# Patient Record
Sex: FEMALE | Race: WHITE | NOT HISPANIC OR LATINO | Employment: OTHER | URBAN - METROPOLITAN AREA
[De-identification: names, ages, dates, MRNs, and addresses within clinical notes are randomized per-mention and may not be internally consistent; named-entity substitution may affect disease eponyms.]

---

## 2017-01-03 ENCOUNTER — OFFICE VISIT (OUTPATIENT)
Dept: ORTHOPEDICS | Facility: CLINIC | Age: 62
End: 2017-01-03
Payer: COMMERCIAL

## 2017-01-03 ENCOUNTER — HOSPITAL ENCOUNTER (OUTPATIENT)
Dept: RADIOLOGY | Facility: HOSPITAL | Age: 62
Discharge: HOME | End: 2017-01-03
Attending: ORTHOPAEDIC SURGERY
Payer: COMMERCIAL

## 2017-01-03 VITALS — HEIGHT: 62 IN | BODY MASS INDEX: 27.79 KG/M2 | WEIGHT: 151 LBS

## 2017-01-03 DIAGNOSIS — M79.671 RIGHT FOOT PAIN: Primary | ICD-10-CM

## 2017-01-03 DIAGNOSIS — M79.671 RIGHT FOOT PAIN: ICD-10-CM

## 2017-01-03 PROCEDURE — 1159F MED LIST DOCD IN RCRD: CPT | Mod: S$GLB,,, | Performed by: PHYSICIAN ASSISTANT

## 2017-01-03 PROCEDURE — 99213 OFFICE O/P EST LOW 20 MIN: CPT | Mod: S$GLB,,, | Performed by: PHYSICIAN ASSISTANT

## 2017-01-03 PROCEDURE — 73630 X-RAY EXAM OF FOOT: CPT | Mod: 26,RT,, | Performed by: RADIOLOGY

## 2017-01-03 PROCEDURE — 73630 X-RAY EXAM OF FOOT: CPT | Mod: TC,RT

## 2017-01-03 PROCEDURE — 99999 PR PBB SHADOW E&M-EST. PATIENT-LVL III: CPT | Mod: PBBFAC,,, | Performed by: PHYSICIAN ASSISTANT

## 2017-01-06 NOTE — PROGRESS NOTES
Subjective:      Patient ID: Lulu Gonzalez is a 61 y.o. female.    Chief Complaint: Pain of the Right Ankle and Pain of the Right Foot    Pain   Pertinent negatives include no chest pain, chills, coughing, fever or rash.       Patient is a 61 year old female who presents to clinic for follow up of of right foot injury. Patient states that on 10/21/2016 she hit her foot on a piece of cement and fell form a standing height.   MRI order which showed :  -anterior talar edema,   -anterior calcaneal marrow edema without fracture,  -avulsion fracture of medial margin of navicula with mild proximal fragment migration  -Severe cuboid bone marrow edema, suspected comminuted non-displaced fracture  - bone bruising of 3rd and 4th metatarsal base without fracture  - malposition of lateral cuneiform relative to cuboid  - edema along LisFranc ligament, grade 2 partial sprain    She stated that she is pain free as long as in the boot. She reports that she has walkes around her home barefoot, pain free.  She denied any pain, numbness or tingling. She reports intermittent minimal swelling at the end of the day. Relieved with elevation.       Review of Systems   Constitution: Negative for chills and fever.   Cardiovascular: Negative for chest pain.   Respiratory: Negative for cough and shortness of breath.    Skin: Negative for color change, dry skin, itching, nail changes, poor wound healing and rash.   Musculoskeletal:        Right foot injury   Neurological: Negative for dizziness.   Psychiatric/Behavioral: Negative for altered mental status. The patient is not nervous/anxious.    All other systems reviewed and are negative.        Objective:        General    Constitutional: She is oriented to person, place, and time. She appears well-developed and well-nourished. No distress.   HENT:   Head: Atraumatic.   Eyes: Conjunctivae are normal.   Cardiovascular: Normal rate.    Pulmonary/Chest: Effort normal.   Neurological: She is  alert and oriented to person, place, and time.   Psychiatric: She has a normal mood and affect. Her behavior is normal.         Right Ankle/Foot Exam     Inspection   Deformity: absent  Erythema: absent  Bruising: Ankle - absent Foot - absent  Effusion: Ankle - absent Foot - absent    Range of Motion   The patient has normal right ankle ROM.    Muscle Strength   The patient has normal right ankle strength.    Other   Sensation: normal        Vascular Exam     Right Pulses  Dorsalis Pedis:      2+            Rads: no obvious fracture or dislocaiton.       Assessment:       Encounter Diagnosis   Name Primary?    Right foot pain Yes          Plan:       Discussed plan with patient.    Patient will wean from boot.   She declined formal therapy.   She is to return to clinic in 4 weeks at time x-ray of her foot  x-ray is needed.

## 2017-03-24 DIAGNOSIS — G47.00 INSOMNIA, UNSPECIFIED TYPE: ICD-10-CM

## 2017-03-24 RX ORDER — ZOLPIDEM TARTRATE 5 MG/1
TABLET ORAL
Qty: 30 TABLET | Refills: 3 | Status: SHIPPED | OUTPATIENT
Start: 2017-03-24 | End: 2018-03-15

## 2017-05-03 DIAGNOSIS — Z79.899 ENCOUNTER FOR MONITORING LONG-TERM PROTON PUMP INHIBITOR THERAPY: Primary | ICD-10-CM

## 2017-05-03 DIAGNOSIS — Z51.81 ENCOUNTER FOR MONITORING LONG-TERM PROTON PUMP INHIBITOR THERAPY: Primary | ICD-10-CM

## 2017-05-03 DIAGNOSIS — K28.9 JEJUNAL ULCER: ICD-10-CM

## 2017-05-03 RX ORDER — PANTOPRAZOLE SODIUM 40 MG/1
40 TABLET, DELAYED RELEASE ORAL
Qty: 90 TABLET | Refills: 1 | Status: SHIPPED | OUTPATIENT
Start: 2017-05-03 | End: 2017-10-30

## 2017-09-18 ENCOUNTER — PATIENT MESSAGE (OUTPATIENT)
Dept: ENDOSCOPY | Facility: HOSPITAL | Age: 62
End: 2017-09-18

## 2017-10-05 ENCOUNTER — PATIENT MESSAGE (OUTPATIENT)
Dept: ENDOSCOPY | Facility: HOSPITAL | Age: 62
End: 2017-10-05

## 2017-11-08 DIAGNOSIS — K28.9 JEJUNAL ULCER: ICD-10-CM

## 2017-11-08 RX ORDER — PANTOPRAZOLE SODIUM 40 MG/1
40 TABLET, DELAYED RELEASE ORAL
Qty: 90 TABLET | Refills: 1 | Status: SHIPPED | OUTPATIENT
Start: 2017-11-08 | End: 2018-05-14 | Stop reason: SDUPTHER

## 2018-03-12 ENCOUNTER — TELEPHONE (OUTPATIENT)
Dept: GASTROENTEROLOGY | Facility: CLINIC | Age: 63
End: 2018-03-12

## 2018-03-12 NOTE — TELEPHONE ENCOUNTER
"----- Message from Celia Espinosa MA sent at 3/12/2018  2:57 PM CDT -----  Contact: Home: [phone]   Mirtha Garrido pt. From 2016.   Pt states that she has  "holes in her stomach again" and would like to see Dr Shannon.  Home: [phone]   "

## 2018-03-13 ENCOUNTER — TELEPHONE (OUTPATIENT)
Dept: GASTROENTEROLOGY | Facility: CLINIC | Age: 63
End: 2018-03-13

## 2018-03-13 NOTE — TELEPHONE ENCOUNTER
"----- Message from Celia Espinosa MA sent at 3/12/2018  2:57 PM CDT -----  Contact: Home: [phone]   Mirtha Garrido pt. From 2016.   Pt states that she has  "holes in her stomach again" and would like to see Dr Shannon.  Home: [phone]   "

## 2018-03-15 ENCOUNTER — OFFICE VISIT (OUTPATIENT)
Dept: GASTROENTEROLOGY | Facility: CLINIC | Age: 63
End: 2018-03-15
Payer: COMMERCIAL

## 2018-03-15 VITALS
SYSTOLIC BLOOD PRESSURE: 150 MMHG | DIASTOLIC BLOOD PRESSURE: 88 MMHG | HEIGHT: 62 IN | BODY MASS INDEX: 31.08 KG/M2 | WEIGHT: 168.88 LBS | HEART RATE: 67 BPM

## 2018-03-15 DIAGNOSIS — R10.13 EPIGASTRIC PAIN: ICD-10-CM

## 2018-03-15 DIAGNOSIS — R13.14 PHARYNGOESOPHAGEAL DYSPHAGIA: ICD-10-CM

## 2018-03-15 DIAGNOSIS — K21.9 GASTROESOPHAGEAL REFLUX DISEASE, ESOPHAGITIS PRESENCE NOT SPECIFIED: Primary | ICD-10-CM

## 2018-03-15 DIAGNOSIS — Z87.19 HISTORY OF SMALL INTESTINE ULCER: ICD-10-CM

## 2018-03-15 DIAGNOSIS — Z12.11 COLON CANCER SCREENING: ICD-10-CM

## 2018-03-15 DIAGNOSIS — Z12.11 SPECIAL SCREENING FOR MALIGNANT NEOPLASMS, COLON: Primary | ICD-10-CM

## 2018-03-15 PROCEDURE — 99999 PR PBB SHADOW E&M-EST. PATIENT-LVL III: CPT | Mod: PBBFAC,,, | Performed by: NURSE PRACTITIONER

## 2018-03-15 PROCEDURE — 99214 OFFICE O/P EST MOD 30 MIN: CPT | Mod: S$GLB,,, | Performed by: NURSE PRACTITIONER

## 2018-03-15 RX ORDER — SODIUM, POTASSIUM,MAG SULFATES 17.5-3.13G
SOLUTION, RECONSTITUTED, ORAL ORAL
Qty: 1 BOTTLE | Refills: 0 | Status: ON HOLD | OUTPATIENT
Start: 2018-03-15 | End: 2018-05-11 | Stop reason: HOSPADM

## 2018-03-15 NOTE — PATIENT INSTRUCTIONS
"     Probiotics      For IBS and bloating, we typically recommend Align, VSL#3, or Villagran Colon Health, which can typically be found without a prescription at the pharmacy. Give this at least  a month to work, but can take up to 3 months to repopulate your intestines, so be patient!     VSL#3 is a potent probiotic which can be found in pill form (try [employer] for best price, or VSL3.com). $52 for a 2 month supply. The sachets are for ulcerative colitis and require a prescription.    If you go on the internet, a reputable/powerful probiotic appears to be Super Shield from [employer] at: http://www.bluerockholistics.Criptext/product/pross.asp  You can also choose PB 8 which can be found at [employer] or online.    For diarrhea from travel or antibiotics, we typically recommend Culturelle or Florastor (especially for diarrhea from C diff infection).         General information:  Pick one that has at least seven strains, and five billion CFU (colony forming units).    Products containing probiotics have flooded the market in recent years. As more people seek natural or non-drug ways to maintain their health, manufacturers have responded by offering probiotics in everything from yogurt to chocolate and granola bars to powders and capsules.    Although probiotics have been around for generations - think of the "live active cultures"in several brands of yogurt - the sheer number of products with probiotics now available may overwhelm even the most conscientious of shoppers. In some respects, the industry has grown faster than the research and scientists and doctors are calling for more studies to help determine which probiotics are beneficial and which might be a waste of money.    What Are Probiotics?  Probiotics are living microscopic organisms, or micro-organisms, that scientific research has shown to benefit your health. Most often they are bacteria, but they may also be other organisms such as yeasts. In some " cases they are similar, or the same, as the good bacteria already in your body, particularly those in your gut.    The most common probiotic bacteria come from two groups, Lactobacillus or Bifidobacterium, although it is important to remember that there are many other types of bacteria that are also classified as probiotics. Each group of bacteria has different species and each species has different strains. This is important to remember because different strains have different benefits for different parts of your body. For example, Lactobacillus casei Shirota has been shown to support the immune system and to help food move through the gut, but Lactobacillus bulgaricus may help relieve symptoms of lactose intolerance, a condition in which people cannot digest the lactose found in most milk and dairy products. In general, not all probiotics are the same, and they dont all work the same way.    Scientists are still sorting out exactly how probiotics work. They may:       Boost your immune system by producing antibodies for certain viruses.  Produce substances that prevent infection.  Prevent harmful bacteria from attaching to the gut wall and growing there.  Send signals to your cells to strengthen the mucus in your intestine and help it act as a barrier against infection.  Inhibit or destroy toxins released by certain bad bacteria that can make you sick.  Produce B vitamins necessary for metabolizing the food you eat, warding off anemia caused by deficiencies in B-6 and B-12, and maintaining healthy skin and a healthy nervous system.      Common Uses  Probiotics are most often used to promote digestive health. Because there are different kinds of probiotics, it is important to find the right one for the specific health benefit you seek. Researchers are still studying which probiotic should be used for which health or disease state. Nevertheless, probiotics have been shown to help regulate the movement of food  through the intestine. They also may help treat digestive disease, something of much interest to gastroenterologists. Some of the most common uses for probiotics include the treatment of the following:    Irritable Bowel Syndrome    Irritable bowel syndrome (IBS) is a disorder of movement in the gut. People who have IBS may have diarrhea, constipation or alternating bouts of both. IBS is not caused by injury or illness. Often the only way doctors can diagnose it is to rule out other conditions through testing.    Probiotics, particularly Bifidobacterium infantis, Sacchromyces boulardii, Lactobacillus plantarum and combination probiotics may help regulate how often people with IBS have bowel movements. Probiotics may also help relieve bloating from gas. Bifidobacterium infantis 68213, Lactobacillus plantarum 299V or Bifidobacterium bifidum [id number] have been shown to help regulate bowel movements and relieve bloating, pain and gas.    Inflammatory Bowel Disease    Though some of the symptoms are the same, inflammatory bowel disease (IBD) is different from IBS because in IBD, the intestines become inflamed. Unlike IBS, IBD is a disorder of the immune system. Symptoms include abdominal cramps, pain, diarrhea, weight loss and blood in your stools. In Crohns disease, ulcers may develop anywhere in your intestine including both the large and small bowels. In ulcerative colitis, inflammation only involves the large intestine. Bouts of inflammation may come and go, but in some cases, prescription medication is needed to keep inflammation in check.        Some studies suggest that probiotics may help decrease inflammation and delay the next bout of disease. Ulcerative colitis seems to respond better to probiotics than Crohns disease does. So far, E. coli Nissle, and a mixture of several strains of Lactobacillus, Bifidobacterium and Streptococcus may be most beneficial. Research is continuing to determine which probiotics  are best to treat IBD.    Infectious Diarrhea    Infectious diarrhea is caused by bacteria, viruses or parasites. There is evidence that probiotics such as Lactobacillus rhamnosus and Lactobacillus casei may be particularly helpful in treating diarrhea caused by rotavirus, which often affects babies and small children. Several strains of Lactobacillus and a strain of the yeast Saccharomyces boulardii may help treat and shorten the course of infectious diarrhea.    Antibiotic-Related Diarrhea  Take Lactobacillus rhamnosus GG and/or Saccharomyces boulardii (Culturelle and/or Florastor) six hours after each dose of antibiotics. Increase the dose to 10 billion CFUs per day and continue for one to two weeks after you stop taking the antibiotic.    Sometimes taking an antibiotic can cause infectious diarrhea by reducing the number of good microorganisms in your gut. Then bacteria that normally do not give you any trouble can grow out of control. One such bacterium is Clostridium difficile, which is a major cause of diarrhea in hospitalized patients and people in long-term care facilities like nursing homes. The trouble with Clostridium difficile is that it tends to come back, but there is evidence that taking probiotics such as Saccharomyces boulardii may help prevent this. There is also evidence that taking probiotics when you first start taking an antibiotic may help prevent antibiotic-related diarrhea in the first place.    Travelers Diarrhea    Its possible to get infectious diarrhea when you travel and your body is exposed to new, normally harmless bacteria (travelers diarrhea). Most studies show that probiotics are not very effective in preventing or treating travelers diarrhea in adults. Taking Saccharomyces boulardii weeks before your trip may help prevent travelers diarrhea, which usually comes from ingesting food or water thats been contaminated with bacteria.    Other Uses    Other potential uses for  probiotics include maintaining a healthy mouth, preventing and treating certain skin conditions like eczema, promoting health in the urinary tract and vagina, and preventing allergies (especially in children). There is not as much research about these uses as there is about the benefits of probiotics for your digestive system, and studies have had mixed results. If you have eczema ?Lactobacillus rhamnosus  and Lactobacillus fermentum VRI-003 PCC have been shown to help treat those itchy, scaly skin rashes -- especially in children.If you have a cold ?Some research suggests that Bifidobacterium animalis lactis Bi-07 and Lactobacillus acidophilus NCFM can help reduce the duration and severity of the common cold and flu by enhancing the bodys production of antibodies. If you have a vaginal infection ?Lactobacillus acidophilus, Lactobacillus rhamnosus GR-1 and Lactobacillus reuteri RC-14 have been shown to help prevent and clear up bacterial vaginosis and urinary tract infections in some individuals. Researchers point to Lactobacillus reuteri RC-14 and Lactobacillus rhamnosus GR-1 as the most effective stains to protect against yeast infections as theyre especially adept at colonizing the vaginal environment and fighting off unwelcome bacteria and fungi. If you have bad breath, gingivitis or periodontitis ?A probiotic lozenge or mouthwash might be your best bet. Lactobacillus reuteri LR-1 or LR-2 promote oral health by binding to teeth and gums, preventing plaque formation in the mouth. Research has demonstrated the ability of Weissella cibaria to freshen breath by inhibiting the production of sulfur compounds in the mouth.    Are Probiotics Safe?  It is generally thought that most probiotics are safe, although it is not yet known if they are safe for people with severely impaired immune systems. They may be taken by people without a diagnosed digestive problem. Their safety is evident since they have a long  history of use in dairy foods like yogurt, cheese and milk.    However, you should talk to your doctor before adding these or any other probiotics to your diet. Probiotics might not be appropriate for seniors. Some probiotics may interfere with or interact with medications. Your doctor will be able to help you determine if probiotics are right for you based on your medical history.    Research about the use of probiotics in children has grown in recent years. Although studies have shown that probiotics may help to treat infectious diarrhea in babies and small children, researchers are unsure whether probiotics are particularly helpful for children with Crohns disease or other types of inflammatory bowel disease. Ask your childs pediatrician about probiotics before giving them to your child.    The exception here is breastfeeding. Breast milk stimulates the growth of normal gut organisms that are important for a babys digestive health and developing immune system. That is one reason why doctors strongly encourage mothers to breastfeed their babies.    Overall, scientists agree that more research is necessary before they can make blanket statements about the safety of probiotics in general or about individual groups and strains. Future studies will show whether probiotics can be used to treat diseases, are safe to use for a long time, and if it is possible to take too many probiotics or mix them in inappropriate ways. These studies will also guide us as to which probiotics to use for different disorders.    Keep in mind that probiotics are considered dietary supplements and are not FDA-regulated like drugs. They are not standardized, meaning they are made in different ways by different companies and have different additives. How well a probiotic works may differ from brand to brand and even from batch to batch within the same brand. Probiotics also vary tremendously in their cost, and cost does not necessarily  reflect higher quality.    Side effects may vary, too. The most common are gas and bloating. These are usually mild and temporary. More serious side effects include allergic reactions, either to the probiotics themselves or to other ingredients in the food or supplement.    Choosing a Probiotic  Probiotics are available in yogurt and other dairy products, chocolate and granola bars, juices, powders, and capsules. You can purchase them at your local supermarket or [employer] food store as well as on the Internet. Here are some tips to help you choose.    Check the label. The more information there is on the label, the better. Ideally, the label will tell you the probiotics group, species and strain, and how many of the microorganisms will still be alive on the use-by date. Although some products guarantee how many organisms were present at the time it was manufactured, often it is less clear how many organisms are present when these products are actually consumed.    Call the . Unfortunately, many labels dont say exactly which strain is in the product; many list only the group and the species, such as Lactobacillus acidophilus or Bifidobacterium lactis. If youre planning to take a probiotic for a specific condition, call the company and find out exactly which strains its products contain and what research they have done to support their health claims. You may be able to find this information on their Web site, as well.    Beware of the Internet. If you order products from the Internet, make sure you know the company from which you are ordering. There are plenty of scammers out there who are willing to send you fake products labeled as probiotics. At best, the ingredients could be harmless, like garlic powder. At worst, they could be laced with powerful herbs, prescription medications or illegal drugs. Some companies may simply take your money and disappear.    Stick to well-established companies and  companies you know. The longer a company has been around, the more likely its products have been tested and studied repeatedly and the bigger the reputation the company has to protect. Some manufacturers that have been making products with probiotics for a while are [employer], [employer], [employer], [employer], [employer], [employer], [employer], and [employer].    Storage    One final note: Remember to store your probiotic according to package instructions and make sure the product has a sell-by or expiration date. Probiotics are living organisms. Even if they are dried and dormant, like in a powder or capsule, they must be stored properly or they will die. Some require refrigeration whereas others do not. They also have a shelf-life, so make sure you use them before the expiration date on the package.

## 2018-03-15 NOTE — PROGRESS NOTES
[facility] Note    Reason for Visit:  The primary encounter diagnosis was Gastroesophageal reflux disease, esophagitis presence not specified. Diagnoses of Epigastric pain, Pharyngoesophageal dysphagia, History of small intestine ulcer, and Colon cancer screening were also pertinent to this visit.    PCP:   Reginaldo Presley       Referring MD:  No referring provider defined for this encounter.    HPI:  This is a 62 y.o. female here for evaluation of Gerd. Ms. Gonzalez is new to the clinic. Hx of gastric pypass in 2004.     Hx of Jejunal ulcer. EGD 2/2016 One non-bleeding jejunal ulcer. Follow up EGD 4/2016- Normal stomach bx no H pylori. Anatomy of a Mae-en-Y gastric bypass. No ulcers. Hiatal hernia.     Currently, taking Protonix 40 mg daily. Having abdominal discomfort, abdominal burning, epigastric pain, and pyrosis x 2 months. Hx of dysphagia with solids and liquids. Will drink liquid or eat a solid and choke in cervical esophagus. Denies inducing vomiting, painful swallowing, SOB, and CP. NSAID usage- none.     Normal formed stool daily. Taking stool softener daily. Increased gas. Denies blood in stool and melena. Pt reports colonoscopy done at  and normal 10 years ago.     ROS:  Constitutional: No fevers, no chills, No unintentional weight loss, no fatigue   ENT: No allergies  CV: No chest pain, no palpitations, no perif. edema  Pulm: No cough, No shortness of breath, no wheezes, no sputum  Ophtho: No vision changes  GI: see HPI; also no nausea, no vomiting, no change in appetite  Derm: No rash  Heme: No lymphadenopathy, No bruising  MSK: No arthritis, no muscle pain, no muscle weakness  : No dysuria, No hematuria  Endo: No hot or cold intolerance  Neuro: No syncope, No seizure     Medical History:  has a past medical history of Hypertension.    Surgical History:  has a past surgical history that includes Cholecystectomy; Gastric bypass; Hernia repair; Hiatal hernia repair; and  "Hysterectomy.    Family History: family history is not on file..     Social History:  reports that she has quit smoking. Her smoking use included Cigarettes. She does not have any smokeless tobacco history on file. She reports that she does not drink alcohol or use drugs.    Review of patient's allergies indicates:  No Known Allergies    Current Outpatient Prescriptions   Medication Sig    levothyroxine (SYNTHROID) 100 MCG tablet Take 100 mcg by mouth once daily.    pantoprazole (PROTONIX) 40 MG tablet TAKE 1 TABLET (40 MG TOTAL) BY MOUTH BEFORE BREAKFAST.    ramipril (ALTACE) 10 MG capsule     sodium,potassium,mag sulfates (SUPREP BOWEL PREP KIT) 17.5-3.13-1.6 gram SolR As directed.  Dispense 1 kit/     No current facility-administered medications for this visit.      Objective Findings:    Vital Signs:  BP (!) 150/88   Pulse 67   Ht 5' 2" (1.575 m)   Wt 76.6 kg (168 lb 14 oz)   BMI 30.89 kg/m²   Body mass index is 30.89 kg/m².    Physical Exam:  General Appearance: Well appearing in no acute distress  Head: Normocephalic, without obvious abnormality  Eyes: No scleral icterus, EOMI  ENT: Neck supple, Lips, mucosa, and tongue normal; teeth and gums normal  Lungs: CTA bilaterally in anterior and posterior fields, no wheezes, no crackles.  Heart: Regular rate and rhythm, no murmurs heard  Abdomen: Soft, epigastric tenderness present upon palpation, non distended with positive bowel sounds in all four quadrants.  Extremities: No clubbing, cyanosis or edema  Skin: No rash to exposed areas  Neurologic: AAOx4    Labs:  Lab Results   Component Value Date    WBC 9.18 02/16/2016    HGB 12.6 02/16/2016    HCT 38.4 02/16/2016     (H) 02/16/2016    ALT 12 02/08/2016    AST 27 02/08/2016     02/16/2016    K 4.0 02/16/2016     02/16/2016    CREATININE 0.6 02/16/2016    BUN 4 (L) 02/16/2016    CO2 25 02/16/2016    INR 1.0 02/08/2016     Endoscopy:    EGD- 2/2016 One non-bleeding jejunal ulcer. Possibly a " second ulcer present.     EGD 4/2016- Normal stomach bx no H pylori. Anatomy of a Mae-en-Y gastric bypass. No ulcers. Hiatal hernia.     Colonoscopy- Pt reports done at  and normal 10 years ago.     Assessment:  1. Gastroesophageal reflux disease, esophagitis presence not specified    2. Epigastric pain    3. Pharyngoesophageal dysphagia    4. History of small intestine ulcer    5. Colon cancer screening      Recommendations:  1., 2., 3., & 4. Schedule EGD to rule out ulcer and esophagitis. Continue taking Protonix 40 mg daily. Ordered labs today- H. Pylori antibody and fasting Lipase. Ordered abd US to rule out biliary etiology. Begin taking a daily probiotic as per handout reviewed and given.   5. Ordered colonoscopy as colon cancer screening.      Follow-up in about 8 weeks (around 5/10/2018). with MICHAEL Joseph.     Order summary:  Orders Placed This Encounter    US Abdomen Complete    H. PYLORI ANTIBODY, IGG    Lipase    Case request GI: ESOPHAGOGASTRODUODENOSCOPY (EGD), COLONOSCOPY     Thank you so much for allowing me to participate in the care of Lulu Garcia, GREGORY, FNP-C

## 2018-03-16 ENCOUNTER — TELEPHONE (OUTPATIENT)
Dept: GASTROENTEROLOGY | Facility: CLINIC | Age: 63
End: 2018-03-16

## 2018-03-16 NOTE — TELEPHONE ENCOUNTER
Spoke with patient in regards to rescheduling egd & colonoscopy to and earlier date.    Patient transferred patient to endoscopy schedulers.

## 2018-03-21 ENCOUNTER — HOSPITAL ENCOUNTER (OUTPATIENT)
Dept: RADIOLOGY | Facility: HOSPITAL | Age: 63
Discharge: HOME | End: 2018-03-21
Attending: NURSE PRACTITIONER
Payer: COMMERCIAL

## 2018-03-21 ENCOUNTER — TELEPHONE (OUTPATIENT)
Dept: GASTROENTEROLOGY | Facility: CLINIC | Age: 63
End: 2018-03-21

## 2018-03-21 DIAGNOSIS — R10.13 EPIGASTRIC PAIN: ICD-10-CM

## 2018-03-21 DIAGNOSIS — R74.8 ALKALINE PHOSPHATASE ELEVATION: Primary | ICD-10-CM

## 2018-03-21 PROCEDURE — 76700 US EXAM ABDOM COMPLETE: CPT | Mod: TC

## 2018-03-21 PROCEDURE — 76700 US EXAM ABDOM COMPLETE: CPT | Mod: 26,,, | Performed by: RADIOLOGY

## 2018-03-21 NOTE — TELEPHONE ENCOUNTER
----- Message from Scout Shannon MD sent at 3/21/2018 10:31 AM CDT -----  Naz - please tell Lulu she has a mild and non-specific elevated Alk phos and recommend follow up fasting LFT's in 6 months (9/2018) orders placed.

## 2018-03-22 DIAGNOSIS — K76.89 HEPATIC CYST: Primary | ICD-10-CM

## 2018-03-22 DIAGNOSIS — R74.8 ELEVATED ALKALINE PHOSPHATASE LEVEL: ICD-10-CM

## 2018-03-23 ENCOUNTER — TELEPHONE (OUTPATIENT)
Dept: GASTROENTEROLOGY | Facility: CLINIC | Age: 63
End: 2018-03-23

## 2018-03-23 NOTE — TELEPHONE ENCOUNTER
----- Message from Emilee Garcia NP sent at 3/22/2018  2:34 PM CDT -----  Please notify MsKirk Gonzalez spoke with Dr. Shannon and reviewed case. Abd US revealed liver cysts and with an elevated liver function test would be beneficial to follow up with hepatology. Referral placed please schedule. Thanks.     Emilee

## 2018-03-23 NOTE — TELEPHONE ENCOUNTER
Spoke with patient and scheduled hepatology appointment as requested by iglesia stevens.    Patient confirmed appointment.

## 2018-03-29 ENCOUNTER — OFFICE VISIT (OUTPATIENT)
Dept: HEPATOLOGY | Facility: CLINIC | Age: 63
End: 2018-03-29
Payer: COMMERCIAL

## 2018-03-29 VITALS
BODY MASS INDEX: 30.18 KG/M2 | RESPIRATION RATE: 18 BRPM | WEIGHT: 164 LBS | DIASTOLIC BLOOD PRESSURE: 67 MMHG | OXYGEN SATURATION: 97 % | TEMPERATURE: 98 F | HEART RATE: 57 BPM | SYSTOLIC BLOOD PRESSURE: 145 MMHG | HEIGHT: 62 IN

## 2018-03-29 DIAGNOSIS — R16.0 LIVER MASS: Primary | ICD-10-CM

## 2018-03-29 DIAGNOSIS — R74.8 ELEVATED ALKALINE PHOSPHATASE LEVEL: ICD-10-CM

## 2018-03-29 PROCEDURE — 99999 PR PBB SHADOW E&M-EST. PATIENT-LVL IV: CPT | Mod: PBBFAC,,, | Performed by: PHYSICIAN ASSISTANT

## 2018-03-29 PROCEDURE — 99204 OFFICE O/P NEW MOD 45 MIN: CPT | Mod: S$GLB,,, | Performed by: PHYSICIAN ASSISTANT

## 2018-03-29 NOTE — PROGRESS NOTES
HEPATOLOGY CLINIC VISIT NOTE     REFERRING PROVIDER: Emilee Garcia    REASON FOR VISIT: elevated alk phos and hepatic cyst    HISTORY: This is a 62 y.o. White female here for evaluation of elevated alkaline phosphatase and a hepatic cyst noted on imaging. On most recent labs, alkaline phosphatase was 168, it was previously 117 in 2016, however there are only two sets of labs to trend. There is no GGT or alkaline phosphatase isoenzymes. She reports that alkaline phos has been elevated x 6 months and has been followed by her PCP. She denies previous h/o liver disease. She denies FH of liver disease. Her PMH is significant for HTN and hypothyroidism. She has had several abdominal surgeries including Mae en Y.      She denies any new medications. She denies drug and alcohol use. She does take vitamins and supplements. She reports that she takes MVI, vitamin E, vitamin C, turmeric, and flax seed.     Pt had U/S to evaluate epigastric pain. ON U/S, she was noted to have multiple hepatic cysts. Ultrasound noted that there  are multiple cystic lesions within the hepatic parenchyma.  The largest is in the left lobe and measures 4.6 x 4.4 x 7.1 cm and has multiple septations, the largest is 4 mm.  This cyst is relatively stable in appearance when compared to CT abdomen pelvis 02/08/2016. Her cysts were noted in her exlap surgical report as well.     Denies jaundice, dark urine, abdominal distention, hematemesis, melena, slowed mentation.   No abnormal skin rashes. No generalized joint pain.                   Past Medical History:   Diagnosis Date    Hypertension      Past Surgical History:   Procedure Laterality Date    CHOLECYSTECTOMY      GASTRIC BYPASS      HERNIA REPAIR      HIATAL HERNIA REPAIR      HYSTERECTOMY       FAMILY HISTORY: Negative for liver disease    SOCIAL HISTORY:   Retired,      History   Smoking Status    Former Smoker    Types: Cigarettes   Smokeless Tobacco    Not on file        History   Alcohol Use No       History   Drug Use No     ROS:   No fever, chills  No chest pain, dyspnea  (+) abdominal pain, no nausea, vomiting  No headaches  No lower extremity edema      PHYSICAL EXAM:  Friendly White female, in no acute distress; alert and oriented to person, place and time  VITALS: reviewed  HEENT: Sclerae anicteric.   NECK: Supple  CVS: Regular rate and rhythm. No murmurs  LUNGS: Normal respiratory effort. Clear bilaterally  ABDOMEN: Flat, soft, nontender. No organomegaly or masses. No ascites or hernias.   SKIN: Warm and dry. No jaundice, No obvious rashes.   EXTREMITIES: No lower extremity edema  NEURO/PSYCH: Normal gate. Memory intact. Thought and speech pattern appropriate. Behavior normal. No depression or anxiety noted.    RECENT LABS:  Lab Results   Component Value Date    WBC 5.09 03/21/2018    HGB 14.1 03/21/2018     03/21/2018     Lab Results   Component Value Date    INR 1.0 02/08/2016     Lab Results   Component Value Date    AST 28 03/21/2018    ALT 23 03/21/2018    BILITOT 0.8 03/21/2018    ALBUMIN 4.1 03/21/2018    ALKPHOS 168 (H) 03/21/2018    CREATININE 0.8 03/21/2018    BUN 16 03/21/2018     03/21/2018    K 4.6 03/21/2018     RECENT IMAGING:  U/S abdomen 03/2018  Narrative     EXAMINATION:  US ABDOMEN COMPLETE    CLINICAL HISTORY:  Epigastric pain    TECHNIQUE:  Complete abdominal ultrasound (including pancreas, liver, gallbladder, common bile duct, spleen, aorta, IVC, and kidneys) was performed.    COMPARISON:  CT abdomen pelvis 02/08/2016    FINDINGS:  Liver: Normal in size, measuring 15.8 cm. Homogeneous echotexture.  There are multiple cystic lesions within the hepatic parenchyma.  The largest is in the left lobe and measures 4.6 x 4.4 x 7.1 cm and has multiple septations, the largest is 4 mm.  This cyst is relatively stable in appearance when compared to CT abdomen pelvis 02/08/2016.    In the right hepatic lobe there are multiple cysts, some with  thin septations.  The largest measures 2.0 x 1.8 x 1.9 cm and has irregular borders.    Gallbladder: Gallbladder has been removed.    Biliary system: The common duct is not dilated, measuring 3 mm and trumpets out.  No intrahepatic ductal dilatation.    Spleen: Normal in size with a homogeneous echotexture, measuring 9.0 x 2.9 cm.    Pancreas: Unable to be visualized secondary to overlying bowel.    Right kidney: Normal in size with no hydronephrosis, measuring 10.1 cm.    Left kidney:  Normal in size with no hydronephrosis, measuring 11.0 cm.  There is a hyperechoic area in the lower pole measuring 1.1 cm that likely represents an AML.    Aorta: No aneurysm.    Inferior vena cava: Normal in appearance.    Miscellaneous: No ascites.   Impression       Multiple liver cysts.  Several with irregular borders and thin septations.  The largest is in the left lobe measuring 7.1 cm with a 4 mm septation and is relatively stable when compared to CT abdomen pelvis 02/08/2016.     ASSESSMENT  62 y.o. White female with:  1. ELEVATED ALKALINE PHOSPHATASE  -- reports duration of 6 months, previously normal  -- no fractionation or GGT to suggest origin  -- will check isoenzymes and GGT, as well as AMA, AMSA, and PHILL  -- if of liver origin, will consider MRCP versus biopsy  -- is post jacqueline en y, ?metabolic bone disease    2. HEPATIC CYSTS  -- appear stable on imaging since 2016  -- AFP, CEA, CA 19 9  -- will repeat U/S in 6 months to monitor for stability    PLAN:  1. Labs today  2. U/S in 6 months  3. F/u TBD based on imaging    Thank you for allowing me to participate in the care of Lulu Gibbs PA-C

## 2018-03-29 NOTE — PROGRESS NOTES
I have reviewed and concur with the BLANKA's history, physical, assessment, and plan.  I have personally interviewed and examined the patient at bedside.  See below addendum for my evaluation and additional findings.     62 y.o. female that presents for evaluation of elevated alkaline phosphatase and hepatic cysts.  Patient has previously had normal liver tests.  Will obtain AP isoenzymes and GGT to determine if this is a hepatic source.  If consistent with liver etiology, AMA and MRCP for further evaluation.  Based on these results, will determine if liver biopsy will be indicated in the future.    Reassured patient that hepatic cysts benign.  No significant compressive symptoms at this time.  Recommend continued surveillance in 6-12 months given complexity of largest cyst at 7cm.  If no concerning change over time, may not require long term surveillance given benign nature of cystic lesions.        Patient will return to clinic with MICHAEL Lynch

## 2018-03-29 NOTE — LETTER
March 29, 2018      Emilee Garcia, NP  [address] [facility] [address]           [facility] - Hepatology  [address] [facility] [address]  [facility] [address]  Phone: [phone]  Fax: [phone]          Patient: Lulu Gonzalez   MR Number: [id number]   YOB: 1955   Date of Visit: 3/29/2018       Dear Emilee Garcia:    Thank you for referring Lulu Gonzalez to me for evaluation. Attached you will find relevant portions of my assessment and plan of care.    If you have questions, please do not hesitate to call me. I look forward to following Lulu Gonzalez along with you.    Sincerely,    SHANIA Mireles  CC:  No Recipients    If you would like to receive this communication electronically, please contact [email or web address] or [phone] to request more information on [employer] Link access.    For providers and/or their staff who would like to refer a patient to [facility], please contact us through our one-stop-shop provider referral line, [facility], at [phone].    If you feel you have received this communication in error or would no longer like to receive these types of communications, please e-mail [email or web address]

## 2018-04-20 ENCOUNTER — LAB VISIT (OUTPATIENT)
Dept: LAB | Facility: HOSPITAL | Age: 63
End: 2018-04-20
Attending: PHYSICIAN ASSISTANT
Payer: COMMERCIAL

## 2018-04-20 DIAGNOSIS — R16.0 LIVER MASS: ICD-10-CM

## 2018-04-20 LAB
AFP SERPL-MCNC: 2.3 NG/ML
CANCER AG19-9 SERPL-ACNC: 23 U/ML
CEA SERPL-MCNC: 3.7 NG/ML
GGT SERPL-CCNC: 15 U/L

## 2018-04-20 PROCEDURE — 84075 ASSAY ALKALINE PHOSPHATASE: CPT

## 2018-04-20 PROCEDURE — 82977 ASSAY OF GGT: CPT

## 2018-04-20 PROCEDURE — 82105 ALPHA-FETOPROTEIN SERUM: CPT

## 2018-04-20 PROCEDURE — 86235 NUCLEAR ANTIGEN ANTIBODY: CPT

## 2018-04-20 PROCEDURE — 86256 FLUORESCENT ANTIBODY TITER: CPT | Mod: 91

## 2018-04-20 PROCEDURE — 82378 CARCINOEMBRYONIC ANTIGEN: CPT

## 2018-04-20 PROCEDURE — 86301 IMMUNOASSAY TUMOR CA 19-9: CPT

## 2018-04-20 PROCEDURE — 84080 ASSAY ALKALINE PHOSPHATASES: CPT

## 2018-04-20 PROCEDURE — 86038 ANTINUCLEAR ANTIBODIES: CPT

## 2018-04-20 PROCEDURE — 36415 COLL VENOUS BLD VENIPUNCTURE: CPT

## 2018-04-23 LAB
ANA SER QL IF: NORMAL
MITOCHONDRIA AB TITR SER IF: NORMAL {TITER}
SMOOTH MUSCLE AB TITR SER IF: NORMAL {TITER}

## 2018-04-26 LAB
ALP BONE CFR SERPL: 73 % (ref 28–66)
ALP INTEST CFR SERPL: 0 % (ref 1–24)
ALP LIVER CFR SERPL: 27 % (ref 25–69)
ALP PLAC CFR SERPL: 0 %
ALP SERPL-CCNC: 134 U/L (ref 33–130)

## 2018-04-30 ENCOUNTER — TELEPHONE (OUTPATIENT)
Dept: HEPATOLOGY | Facility: CLINIC | Age: 63
End: 2018-04-30

## 2018-04-30 NOTE — TELEPHONE ENCOUNTER
----- Message from Ayo Gibbs PA-C sent at 4/30/2018  8:35 AM CDT -----  Please notify her that these labs are stable. The alkaline phosphatase breakdown shows that the elevation is not coming from her liver but could be of bone origin. I suggest she see her PCP for further evaluation of this. We will repeat her U/S in 6 months, but no f/u is needed in liver clinic. Thanks

## 2018-05-10 ENCOUNTER — ANESTHESIA EVENT (OUTPATIENT)
Dept: ENDOSCOPY | Facility: HOSPITAL | Age: 63
End: 2018-05-10
Payer: COMMERCIAL

## 2018-05-11 ENCOUNTER — ANESTHESIA (OUTPATIENT)
Dept: ENDOSCOPY | Facility: HOSPITAL | Age: 63
End: 2018-05-11
Payer: COMMERCIAL

## 2018-05-11 ENCOUNTER — SURGERY (OUTPATIENT)
Age: 63
End: 2018-05-11

## 2018-05-11 ENCOUNTER — HOSPITAL ENCOUNTER (OUTPATIENT)
Facility: HOSPITAL | Age: 63
Discharge: HOME | End: 2018-05-11
Attending: INTERNAL MEDICINE | Admitting: INTERNAL MEDICINE
Payer: COMMERCIAL

## 2018-05-11 VITALS
BODY MASS INDEX: 29.44 KG/M2 | HEIGHT: 62 IN | SYSTOLIC BLOOD PRESSURE: 138 MMHG | WEIGHT: 160 LBS | TEMPERATURE: 98 F | DIASTOLIC BLOOD PRESSURE: 72 MMHG | HEART RATE: 54 BPM | RESPIRATION RATE: 18 BRPM | OXYGEN SATURATION: 99 %

## 2018-05-11 DIAGNOSIS — K21.9 GASTROESOPHAGEAL REFLUX DISEASE, ESOPHAGITIS PRESENCE NOT SPECIFIED: ICD-10-CM

## 2018-05-11 PROCEDURE — 43239 EGD BIOPSY SINGLE/MULTIPLE: CPT | Performed by: INTERNAL MEDICINE

## 2018-05-11 PROCEDURE — E9220 PRA ENDO ANESTHESIA: HCPCS | Mod: 33,,, | Performed by: NURSE ANESTHETIST, CERTIFIED REGISTERED

## 2018-05-11 PROCEDURE — 45380 COLONOSCOPY AND BIOPSY: CPT | Performed by: INTERNAL MEDICINE

## 2018-05-11 PROCEDURE — 88305 TISSUE EXAM BY PATHOLOGIST: CPT | Performed by: PATHOLOGY

## 2018-05-11 PROCEDURE — 88305 TISSUE EXAM BY PATHOLOGIST: CPT | Mod: 26,,, | Performed by: PATHOLOGY

## 2018-05-11 PROCEDURE — 25000003 PHARM REV CODE 250: Performed by: NURSE ANESTHETIST, CERTIFIED REGISTERED

## 2018-05-11 PROCEDURE — 43239 EGD BIOPSY SINGLE/MULTIPLE: CPT | Mod: 51,,, | Performed by: INTERNAL MEDICINE

## 2018-05-11 PROCEDURE — 45380 COLONOSCOPY AND BIOPSY: CPT | Mod: 33,,, | Performed by: INTERNAL MEDICINE

## 2018-05-11 PROCEDURE — 27201012 HC FORCEPS, HOT/COLD, DISP: Performed by: INTERNAL MEDICINE

## 2018-05-11 PROCEDURE — 37000009 HC ANESTHESIA EA ADD 15 MINS: Performed by: INTERNAL MEDICINE

## 2018-05-11 PROCEDURE — 37000008 HC ANESTHESIA 1ST 15 MINUTES: Performed by: INTERNAL MEDICINE

## 2018-05-11 PROCEDURE — 63600175 PHARM REV CODE 636 W HCPCS: Performed by: NURSE ANESTHETIST, CERTIFIED REGISTERED

## 2018-05-11 PROCEDURE — 25000003 PHARM REV CODE 250: Performed by: INTERNAL MEDICINE

## 2018-05-11 RX ORDER — PROPOFOL 10 MG/ML
VIAL (ML) INTRAVENOUS CONTINUOUS PRN
Status: DISCONTINUED | OUTPATIENT
Start: 2018-05-11 | End: 2018-05-11

## 2018-05-11 RX ORDER — PROPOFOL 10 MG/ML
VIAL (ML) INTRAVENOUS
Status: DISCONTINUED | OUTPATIENT
Start: 2018-05-11 | End: 2018-05-11

## 2018-05-11 RX ORDER — SODIUM CHLORIDE 9 MG/ML
INJECTION, SOLUTION INTRAVENOUS CONTINUOUS
Status: DISCONTINUED | OUTPATIENT
Start: 2018-05-11 | End: 2018-05-11 | Stop reason: HOSPADM

## 2018-05-11 RX ORDER — LIDOCAINE HCL/PF 100 MG/5ML
SYRINGE (ML) INTRAVENOUS
Status: DISCONTINUED | OUTPATIENT
Start: 2018-05-11 | End: 2018-05-11

## 2018-05-11 RX ORDER — GLYCOPYRROLATE 0.2 MG/ML
INJECTION INTRAMUSCULAR; INTRAVENOUS
Status: DISCONTINUED | OUTPATIENT
Start: 2018-05-11 | End: 2018-05-11

## 2018-05-11 RX ADMIN — GLYCOPYRROLATE 0.1 MG: 0.2 INJECTION, SOLUTION INTRAMUSCULAR; INTRAVENOUS at 08:05

## 2018-05-11 RX ADMIN — PROPOFOL 80 MG: 10 INJECTION, EMULSION INTRAVENOUS at 08:05

## 2018-05-11 RX ADMIN — SODIUM CHLORIDE: 0.9 INJECTION, SOLUTION INTRAVENOUS at 07:05

## 2018-05-11 RX ADMIN — PROPOFOL 30 MG: 10 INJECTION, EMULSION INTRAVENOUS at 08:05

## 2018-05-11 RX ADMIN — LIDOCAINE HYDROCHLORIDE 75 MG: 20 INJECTION, SOLUTION INTRAVENOUS at 08:05

## 2018-05-11 RX ADMIN — PROPOFOL 20 MG: 10 INJECTION, EMULSION INTRAVENOUS at 08:05

## 2018-05-11 RX ADMIN — PROPOFOL 150 MCG/KG/MIN: 10 INJECTION, EMULSION INTRAVENOUS at 08:05

## 2018-05-11 RX ADMIN — SODIUM CHLORIDE: 0.9 INJECTION, SOLUTION INTRAVENOUS at 09:05

## 2018-05-11 NOTE — DISCHARGE INSTRUCTIONS
Eating a High-Fiber Diet  Fiber is what gives strength and structure to plants. Most grains, beans, vegetables, and fruits contain fiber. Foods rich in fiber are often low in calories and fat, and they fill you up more. They may also reduce your risks for certain health problems. To find out the amount of fiber in canned, packaged, or frozen foods, read the Nutrition Facts label. It tells you how much fiber is in a serving.    Types of fiber and their benefits  There are two types of fiber: insoluble and soluble. They both aid digestion and help you maintain a healthy weight.  · Insoluble fiber. This is found in whole grains, cereals, certain fruits and vegetables such as apple skin, corn, and carrots. Insoluble fiber may prevent constipation and reduce the risk for certain types of cancer.  · Soluble fiber. This type of fiber is in oats, beans, and certain fruits and vegetables such as strawberries and peas. Soluble fiber can reduce cholesterol, which may help lower the risk for heart disease. It also helps control blood sugar levels.  Look for high-fiber foods  Try these foods to add fiber to your diet:  · Whole-grain breads and cereals. Try to eat 6 to 8 ounces a day. Include wheat and oat bran cereals, whole-wheat muffins or toast, and corn tortillas in your meals.  · Fruits. Try to eat 2 cups a day. Apples, oranges, strawberries, pears, and bananas are good sources. (Note: Fruit juice is low in fiber.)  · Vegetables. Try to eat at least 2.5 cups a day. Add asparagus, carrots, broccoli, peas, and corn to your meals.  · Beans. One cup of cooked lentils gives you over 15 grams of fiber. Try navy beans, lentils, and chickpeas.  · Seeds. A small handful of seeds gives you about 3 grams of fiber. Try sunflower seeds.  Keep track of your fiber  Keep track of how much fiber you eat. Start by reading food labels. Then eat a variety of foods high in fiber. As you begin to eat more fiber, ask your healthcare provider  how much water you should be drinking to keep your digestive system working smoothly.  You should aim for a certain amount of fiber in your diet each day. If you are a woman, that amount is between 25 and 28 grams per day. Men should aim for 30 to 33 grams per day. After age 50, your daily fiber needs drop to 22 grams for women and 28 grams for men.  Before you reach for the fiber supplements, think about this. Fiber is found naturally in healthy whole foods. It gives you that feeling of fullness after you eat. Taking fiber supplements or eating fiber-enriched foods will not give you this full feeling.  Your fiber intake is a good measure for the quality of your overall diet. If you are missing out on your daily amount of fiber, you may be lacking other important nutrients as well.  Date Last Reviewed: 5/11/2015 © 2000-2017 [employer]. [address] [place] [address]. All rights reserved. This information is not intended as a substitute for professional medical care. Always follow your healthcare professional's instructions.        Diverticulosis    Diverticulosis means that small pouches have formed in the wall of your large intestine (colon). Most often, this problem causes no symptoms and is common as people age. But the pouches in the colon are at risk of becoming infected. When this happens, the condition is called diverticulitis. Although most people with diverticulosis never develop diverticulitis, it is still not uncommon. Rectal bleeding can also occur and in less common situations, a type of colon inflammation called colitis.  While most people do not have symptoms, some people with diverticulosis may have:  · Abdominal cramps and pain  · Bloating  · Constipation  · Change in bowel habits  Causes  The exact cause of diverticulosis (and diverticulitis) has not been proved, but a few things are associated with the condition:  · Low-fiber diet  · Constipation  · Lack of  exercise  Your healthcare provider will talk with you about how to manage your condition. Diet changes may be all that are needed to help control diverticulosis and prevent progression to diverticulitis. If you develop diverticulitis, you will likely need other treatments.  Home care  You may be told to take fiber supplements daily. Fiber adds bulk to the stool so that it passes through the colon more easily. Stool softeners may be recommended. You may also be given medications for pain relief. Be sure to take all medications as directed.  In the past, people were told to avoid corn, nuts, and seeds. This is no longer necessary.  Follow these guidelines when caring for yourself at home:  · Eat unprocessed foods that are high in fiber. Whole grains, fruits, and vegetables are good choices.  · Drink 6 to 8 glasses of water every day unless your healthcare provider has you limit how much fluid you should have.  · Watch for changes in your bowel movements. Tell your provider if you notice any changes.  · Begin an exercise program. Ask your provider how to get started. Generally, walking is the best.  · Get plenty of rest and sleep.  Follow-up care  Follow up with your healthcare provider, or as advised. Regular visits may be needed to check on your health. Sometimes special procedures such as colonoscopy, are needed after an episode of diverticulitis or blooding. Be sure to keep all your appointments.  If a stool sample was taken, or cultures were done, you should be told if they are positive, or if your treatment needs to be changed. You can call as directed for the results.  If X-rays were done, a radiologist will look at them. You will be told if there is a change in your treatment.  If antibiotics were prescribed, be sure to finish them all.  When to seek medical advice  Call your healthcare provider right away if any of these occur:  · Fever of 100.4°F (38°C) or higher, or as directed by your healthcare  provider  · Severe cramps in the lower left side of the abdomen or pain that is getting worse  · Tenderness in the lower left side of the abdomen or worsening pain throughout the abdomen  · Diarrhea or constipation that doesn't get better within 24 hours  · Nausea and vomiting  · Bleeding from the rectum  Call 911  Call emergency services if any of the following occur:  · Trouble breathing  · Confusion  · Very drowsy or trouble awakening  · Fainting or loss of consciousness  · Rapid heart rate  · Chest pain  Date Last Reviewed: 12/30/2015 © 2000-2017 [employer]. [address] [place] [address]. All rights reserved. This information is not intended as a substitute for professional medical care. Always follow your healthcare professional's instructions.        Upper GI Endoscopy     During endoscopy, a long, flexible tube is used to view the inside of your upper GI tract.      Upper GI endoscopy allows your healthcare provider to look directly into the beginning of your gastrointestinal (GI) tract. The esophagus, stomach, and duodenum (the first part of the small intestine) make up the upper GI tract.   Before the exam  Follow these and any other instructions you are given before your endoscopy. If you dont follow the healthcare providers instructions carefully, the test may need to be canceled or done over:  · Don't eat or drink anything after midnight the night before your exam. If your exam is in the afternoon, drink only clear liquids in the morning. Don't eat or drink anything for 8 hours before the exam. In some cases, you may be able to take medicines with sips of water until 2 hours before the procedure. Speak with your healthcare provider about this.   · Bring your X-rays and any other test results you have.  · Because you will be sedated, arrange for an adult to drive you home after the exam.  · Tell your healthcare provider before the exam if you are taking any medicines or have  any medical problems.  The procedure  Here is what to expect:  · You will lie on the endoscopy table. Usually patients lie on the left side.  · You will be monitored and given oxygen.  · Your throat may be numbed with a spray or gargle. You are given medicine through an intravenous (IV) line that will help you relax and remain comfortable. You may be awake or asleep during the procedure.  · The healthcare provider will put the endoscope in your mouth and down your esophagus. It is thinner than most pieces of food that you swallow. It will not affect your breathing. The medicine helps keep you from gagging.  · Air is put into your GI tract to expand it. It can make you burp.  · During the procedure, the healthcare provider can take biopsies (tissue samples), remove abnormalities, such as polyps, or treat abnormalities through a variety of devices placed through the endoscope. You will not feel this.   · The endoscope carries images of your upper GI tract to a video screen. If you are awake, you may be able to look at the images.  · After the procedure is done, you will rest for a time. An adult must drive you home.  When to call your healthcare provider  Contact your healthcare provider if you have:  · Black or tarry stools, or blood in your stool  · Fever  · Pain in your belly that does not go away  · Nausea and vomiting, or vomiting blood   Date Last Reviewed: 7/1/2016  © 9243-6064 [employer]. [address] [place] [address], [place] [address]. All rights reserved. This information is not intended as a substitute for professional medical care. Always follow your healthcare professional's instructions.

## 2018-05-11 NOTE — TRANSFER OF CARE
"Anesthesia Transfer of Care Note    Patient: Lulu Gonzalez    Procedure(s) Performed: Procedure(s) (LRB):  ESOPHAGOGASTRODUODENOSCOPY (EGD) (N/A)  COLONOSCOPY (N/A)    Patient location: PACU    Anesthesia Type: general    Transport from OR: Transported from OR on room air with adequate spontaneous ventilation    Post pain: adequate analgesia    Post assessment: no apparent anesthetic complications and tolerated procedure well    Post vital signs: stable    Level of consciousness: awake, alert and oriented    Nausea/Vomiting: no nausea/vomiting    Complications: none    Transfer of care protocol was followed      Last vitals:   Visit Vitals  /72   Pulse 65   Temp 36.4 °C (97.5 °F)   Resp 16   Ht 5' 2" (1.575 m)   Wt 72.6 kg (160 lb)   SpO2 97%   Breastfeeding? No   BMI 29.26 kg/m²     "

## 2018-05-11 NOTE — PROVATION PATIENT INSTRUCTIONS
Discharge Summary/Instructions after an Endoscopic Procedure  Patient Name: Lulu Gonzalez  Patient MRN: [id number]  Patient YOB: 1955  Friday, May 11, 2018  Scout Shannon MD  RESTRICTIONS:  During your procedure today, you received medications for sedation.  These   medications may affect your judgment, balance and coordination.  Therefore,   for 24 hours, you have the following restrictions:   - DO NOT drive a car, operate machinery, make legal/financial decisions,   sign important papers or drink alcohol.    ACTIVITY:  The following day: return to full activity including work, except no heavy   lifting, straining or running for 3 days if polyps were removed.  DIET:  Eat and drink normally unless instructed otherwise.     TREATMENT FOR COMMON SIDE EFFECTS:  - Mild abdominal pain, nausea, belching, bloating or excessive gas:  rest,   eat lightly and use a heating pad.  - Sore Throat: treat with throat lozenges and/or gargle with warm salt   water.  - Because air was used during the procedure, expelling large amounts of air   from your rectum or belching is normal.  - If a bowel prep was taken, you may not have a bowel movement for 1-3 days.    This is normal.  SYMPTOMS TO WATCH FOR AND REPORT TO YOUR PHYSICIAN:  1. Abdominal pain or bloating, other than gas cramps.  2. Chest pain.  3. Back pain.  4. Signs of infection such as: chills or fever occurring within 24 hours   after the procedure.  5. Rectal bleeding, which would show as bright red, maroon, or black stools.   (A tablespoon of blood from the rectum is not serious, especially if   hemorrhoids are present.)  6. Vomiting.  7. Weakness or dizziness.  GO DIRECTLY TO THE NEAREST EMERGENCY ROOM IF YOU HAVE ANY OF THE FOLLOWING:      Difficulty breathing              Chills and/or fever over 101 F   Persistent vomiting and/or vomiting blood   Severe abdominal pain   Severe chest pain   Black, tarry stools   Bleeding- more than one  tablespoon   Any other symptom or condition that you feel may need urgent attention  Your doctor recommends these additional instructions:  If any biopsies were taken, your doctors clinic will contact you in 1 to 2   weeks with any results.  - Discharge patient to home.   - Await pathology results.   - Telephone endoscopist for pathology results in 2 weeks.   - Repeat colonoscopy in 5 years for surveillance based on pathology results.     - The findings and recommendations were discussed with the patient.   - Return to nurse practitioner.  For questions, problems or results please call your physician - Scout Shannon MD at Work:  [phone].  [facility] [place], EMERGENCY ROOM PHONE NUMBER: [phone]  IF A COMPLICATION OR EMERGENCY SITUATION ARISES AND YOU ARE UNABLE TO REACH   YOUR PHYSICIAN - GO DIRECTLY TO THE EMERGENCY ROOM.  Scout Shannon MD  5/11/2018 9:32:53 AM  This report has been verified and signed electronically.

## 2018-05-11 NOTE — PROVATION PATIENT INSTRUCTIONS
Discharge Summary/Instructions after an Endoscopic Procedure  Patient Name: Lulu Gonzalez  Patient MRN: [id number]  Patient YOB: 1955  Friday, May 11, 2018  Scout Shannon MD  RESTRICTIONS:  During your procedure today, you received medications for sedation.  These   medications may affect your judgment, balance and coordination.  Therefore,   for 24 hours, you have the following restrictions:   - DO NOT drive a car, operate machinery, make legal/financial decisions,   sign important papers or drink alcohol.    ACTIVITY:  The following day: return to full activity including work, except no heavy   lifting, straining or running for 3 days if polyps were removed.  DIET:  Eat and drink normally unless instructed otherwise.     TREATMENT FOR COMMON SIDE EFFECTS:  - Mild abdominal pain, nausea, belching, bloating or excessive gas:  rest,   eat lightly and use a heating pad.  - Sore Throat: treat with throat lozenges and/or gargle with warm salt   water.  - Because air was used during the procedure, expelling large amounts of air   from your rectum or belching is normal.  - If a bowel prep was taken, you may not have a bowel movement for 1-3 days.    This is normal.  SYMPTOMS TO WATCH FOR AND REPORT TO YOUR PHYSICIAN:  1. Abdominal pain or bloating, other than gas cramps.  2. Chest pain.  3. Back pain.  4. Signs of infection such as: chills or fever occurring within 24 hours   after the procedure.  5. Rectal bleeding, which would show as bright red, maroon, or black stools.   (A tablespoon of blood from the rectum is not serious, especially if   hemorrhoids are present.)  6. Vomiting.  7. Weakness or dizziness.  GO DIRECTLY TO THE NEAREST EMERGENCY ROOM IF YOU HAVE ANY OF THE FOLLOWING:      Difficulty breathing              Chills and/or fever over 101 F   Persistent vomiting and/or vomiting blood   Severe abdominal pain   Severe chest pain   Black, tarry stools   Bleeding- more than one  tablespoon   Any other symptom or condition that you feel may need urgent attention  Your doctor recommends these additional instructions:  If any biopsies were taken, your doctors clinic will contact you in 1 to 2   weeks with any results.  - Discharge patient to home.   - Await pathology results.   - Telephone endoscopist for pathology results in 2 weeks.   - Return to nurse practitioner.  For questions, problems or results please call your physician - Scout Shannon MD at Work:  [phone].  [facility], EMERGENCY ROOM PHONE NUMBER: [phone]  IF A COMPLICATION OR EMERGENCY SITUATION ARISES AND YOU ARE UNABLE TO REACH   YOUR PHYSICIAN - GO DIRECTLY TO THE EMERGENCY ROOM.  Scout Shannon MD  5/11/2018 9:35:15 AM  This report has been verified and signed electronically.

## 2018-05-11 NOTE — ANESTHESIA PREPROCEDURE EVALUATION
05/11/2018  Lulu Gonzalez is a 62 y.o., female.  Past Medical History:   Diagnosis Date    Hypertension      Past Surgical History:   Procedure Laterality Date    CHOLECYSTECTOMY      EXPLORATORY LAPAROTOMY W/ BOWEL RESECTION      GASTRIC BYPASS      HERNIA REPAIR      HIATAL HERNIA REPAIR      HYSTERECTOMY           Anesthesia Evaluation    I have reviewed the Patient Summary Reports.     I have reviewed the Medications.     Review of Systems  Anesthesia Hx:  No problems with previous Anesthesia Denies Hx of Anesthetic complications  Denies Family Hx of Anesthesia complications.   Denies Personal Hx of Anesthesia complications.   Social:  Non-Smoker    Hematology/Oncology:  Hematology Normal   Oncology Normal     EENT/Dental:EENT/Dental Normal   Cardiovascular:   Exercise tolerance: good Hypertension    Pulmonary:  Pulmonary Normal    Renal/:  Renal/ Normal     Hepatic/GI:   Bowel Prep. GERD H/o gastric bypass   Musculoskeletal:  Musculoskeletal Normal    Neurological:  Neurology Normal    Endocrine:  Endocrine Normal    Dermatological:  Skin Normal    Psych:  Psychiatric Normal           Physical Exam  General:  Well nourished    Airway/Jaw/Neck:  Airway Findings: Mouth Opening: Normal Tongue: Normal  General Airway Assessment: Adult  Mallampati: II  TM Distance: Normal, at least 6 cm  Jaw/Neck Findings:  Neck ROM: Normal ROM     Eyes/Ears/Nose:  EYES/EARS/NOSE FINDINGS: Normal   Dental:  Dental Findings: In tact   Chest/Lungs:  Chest/Lungs Findings: Clear to auscultation, Normal Respiratory Rate     Heart/Vascular:  Heart Findings: Rate: Normal  Sounds: Normal     Abdomen:  Abdomen Findings:  Normal     Musculoskeletal:  Musculoskeletal Findings: Normal   Skin:  Skin Findings: Normal    Mental Status:  Mental Status Findings:  Cooperative, Alert and Oriented         Anesthesia Plan  Type  of Anesthesia, risks & benefits discussed:  Anesthesia Type:  general  Patient's Preference: General  Intra-op Monitoring Plan: standard ASA monitors  Intra-op Monitoring Plan Comments:   Post Op Pain Control Plan:   Post Op Pain Control Plan Comments:   Induction:   IV  Beta Blocker:  Patient is not currently on a Beta-Blocker (No further documentation required).       Informed Consent: Patient understands risks and agrees with Anesthesia plan.  Questions answered. Anesthesia consent signed with patient.  ASA Score: 2     Day of Surgery Review of History & Physical: I have interviewed and examined the patient. I have reviewed the patient's H&P dated:  There are no significant changes.  H&P update referred to the provider.     Anesthesia Plan Notes: NPO confirmed        Ready For Surgery From Anesthesia Perspective.

## 2018-05-11 NOTE — ANESTHESIA POSTPROCEDURE EVALUATION
"Anesthesia Post Evaluation    Patient: Lulu Gonzalez    Procedure(s) Performed: Procedure(s) (LRB):  ESOPHAGOGASTRODUODENOSCOPY (EGD) (N/A)  COLONOSCOPY (N/A)    Final Anesthesia Type: general  Patient location during evaluation: GI PACU  Patient participation: Yes- Able to Participate  Level of consciousness: awake and alert  Post-procedure vital signs: reviewed and stable  Pain management: adequate  Airway patency: patent  PONV status at discharge: No PONV  Anesthetic complications: no      Cardiovascular status: blood pressure returned to baseline  Respiratory status: unassisted, spontaneous ventilation and room air  Hydration status: euvolemic          Visit Vitals  /72   Pulse (!) 54   Temp 36.4 °C (97.5 °F)   Resp 18   Ht 5' 2" (1.575 m)   Wt 72.6 kg (160 lb)   SpO2 99%   Breastfeeding? No   BMI 29.26 kg/m²       Pain/Best Score: Pain Assessment Performed: Yes (5/11/2018 10:07 AM)  Presence of Pain: denies (5/11/2018 10:07 AM)  Best Score: 10 (5/11/2018 10:07 AM)      "

## 2018-05-11 NOTE — H&P
[facility]  History & Physical    Subjective:      Chief Complaint/Reason for Admission:     EGD and colonoscopy    Lulu Gonzalez is a 62 y.o. female.    Past Medical History:   Diagnosis Date    Hypertension      Past Surgical History:   Procedure Laterality Date    CHOLECYSTECTOMY      EXPLORATORY LAPAROTOMY W/ BOWEL RESECTION      GASTRIC BYPASS      HERNIA REPAIR      HIATAL HERNIA REPAIR      HYSTERECTOMY       History reviewed. No pertinent family history.  Social History   Substance Use Topics    Smoking status: Former Smoker     Types: Cigarettes    Smokeless tobacco: Never Used    Alcohol use No       PTA Medications   Medication Sig    levothyroxine (SYNTHROID) 100 MCG tablet Take 100 mcg by mouth once daily.    ramipril (ALTACE) 10 MG capsule     sodium,potassium,mag sulfates (SUPREP BOWEL PREP KIT) 17.5-3.13-1.6 gram SolR As directed.  Dispense 1 kit/    pantoprazole (PROTONIX) 40 MG tablet TAKE 1 TABLET (40 MG TOTAL) BY MOUTH BEFORE BREAKFAST.     Review of patient's allergies indicates:  No Known Allergies     Review of Systems   Constitutional: Negative for chills and fever.   Respiratory: Negative for shortness of breath and wheezing.    Cardiovascular: Negative for chest pain.   Gastrointestinal: Positive for abdominal pain and heartburn.       Objective:      Vital Signs (Most Recent)  Temp: 97.2 °F (36.2 °C) (05/11/18 0728)  Pulse: 64 (05/11/18 0728)  Resp: 16 (05/11/18 0728)  BP: (!) 143/66 (05/11/18 0728)  SpO2: 97 % (05/11/18 0728)    Vital Signs Range (Last 24H):  Temp:  [97.2 °F (36.2 °C)]   Pulse:  [64]   Resp:  [16]   BP: (143)/(66)   SpO2:  [97 %]     Physical Exam   Constitutional: She appears well-developed and well-nourished.   Cardiovascular: Normal rate.    Pulmonary/Chest: Effort normal and breath sounds normal.   Abdominal: Soft. Bowel sounds are normal.   Skin: Skin is warm and dry.   Psychiatric: She has a normal mood and affect. Her  behavior is normal. Judgment and thought content normal.           Assessment:      Active Hospital Problems    Diagnosis  POA    Gastroesophageal reflux disease [K21.9]  Yes      Resolved Hospital Problems    Diagnosis Date Resolved POA   No resolved problems to display.       Plan:    EGD for GERD and dysphagia and epigastric pain and screening colonoscopy

## 2018-05-14 DIAGNOSIS — K28.9 JEJUNAL ULCER: ICD-10-CM

## 2018-05-14 RX ORDER — PANTOPRAZOLE SODIUM 40 MG/1
40 TABLET, DELAYED RELEASE ORAL
Qty: 90 TABLET | Refills: 1 | Status: SHIPPED | OUTPATIENT
Start: 2018-05-14 | End: 2018-11-06 | Stop reason: SDUPTHER

## 2018-05-18 ENCOUNTER — TELEPHONE (OUTPATIENT)
Dept: ENDOSCOPY | Facility: HOSPITAL | Age: 63
End: 2018-05-18

## 2018-11-06 DIAGNOSIS — K28.9 JEJUNAL ULCER: ICD-10-CM

## 2018-11-06 RX ORDER — PANTOPRAZOLE SODIUM 40 MG/1
40 TABLET, DELAYED RELEASE ORAL
Qty: 90 TABLET | Refills: 1 | Status: SHIPPED | OUTPATIENT
Start: 2018-11-06 | End: 2019-05-01 | Stop reason: SDUPTHER

## 2019-03-14 ENCOUNTER — OFFICE VISIT (OUTPATIENT)
Dept: OBSTETRICS AND GYNECOLOGY | Facility: CLINIC | Age: 64
End: 2019-03-14
Payer: COMMERCIAL

## 2019-03-14 VITALS — BODY MASS INDEX: 29.5 KG/M2 | WEIGHT: 161.25 LBS

## 2019-03-14 DIAGNOSIS — Z01.419 WELL WOMAN EXAM WITH ROUTINE GYNECOLOGICAL EXAM: Primary | ICD-10-CM

## 2019-03-14 PROCEDURE — 99396 PREV VISIT EST AGE 40-64: CPT | Mod: S$GLB,,, | Performed by: OBSTETRICS & GYNECOLOGY

## 2019-03-14 PROCEDURE — 99999 PR PBB SHADOW E&M-EST. PATIENT-LVL III: ICD-10-PCS | Mod: PBBFAC,,, | Performed by: OBSTETRICS & GYNECOLOGY

## 2019-03-14 PROCEDURE — 99396 PR PREVENTIVE VISIT,EST,40-64: ICD-10-PCS | Mod: S$GLB,,, | Performed by: OBSTETRICS & GYNECOLOGY

## 2019-03-14 PROCEDURE — 99999 PR PBB SHADOW E&M-EST. PATIENT-LVL III: CPT | Mod: PBBFAC,,, | Performed by: OBSTETRICS & GYNECOLOGY

## 2019-03-14 NOTE — PROGRESS NOTES
Subjective:       Patient ID: Lulu Gonzalez is a 63 y.o. female.    Chief Complaint:  Gynecologic Exam      History of Present Illness  64yo  presents for annual exam.  No complaints today.  H/o hysterectomy at age 32, no h/o abn paps.  Still has ovaries.  No hot flashes/night sweats.  Nonsmoker.  MMG up to date with PCP.  Colonoscopy last year.  No s/sx of depression.    GYN & OB History  No LMP recorded. Patient has had a hysterectomy.   Date of Last Pap: 2016    OB History    Para Term  AB Living   3 3           SAB TAB Ectopic Multiple Live Births                  # Outcome Date GA Lbr Juan Manuel/2nd Weight Sex Delivery Anes PTL Lv   3 Para            2 Para            1 Para                   Review of Systems  Review of Systems   Constitutional: Negative for chills and fever.   HENT: Negative for nasal congestion and mouth sores.    Eyes: Negative for visual disturbance.   Respiratory: Negative for cough and shortness of breath.    Cardiovascular: Negative for chest pain and palpitations.   Gastrointestinal: Negative for abdominal pain, bloating, constipation, diarrhea, nausea and vomiting.   Endocrine: Negative for hair loss and hot flashes.   Genitourinary: Negative for hot flashes, pelvic pain, vaginal discharge, urinary incontinence, postmenopausal bleeding and vaginal dryness.   Musculoskeletal: Negative for back pain and joint swelling.   Integumentary:  Negative for rash, mole/lesion, breast mass and nipple discharge.   Neurological: Negative for seizures and headaches.   Hematological: Negative for adenopathy. Does not bruise/bleed easily.   Psychiatric/Behavioral: Negative for depression.   Breast: Negative for lump, mass and nipple discharge          Objective:    Physical Exam:   Constitutional: She is oriented to person, place, and time. She appears well-developed and well-nourished.    HENT:   Head: Normocephalic and atraumatic.    Eyes: Conjunctivae and EOM are normal.     Neck: Normal range of motion. Neck supple.    Cardiovascular: Normal rate, regular rhythm and normal heart sounds.     Pulmonary/Chest: Effort normal and breath sounds normal.        Abdominal: Soft. She exhibits no distension and no mass. There is no tenderness.     Genitourinary:   Genitourinary Comments: NAEFG.  Atrophic vaginal tissue.  S/p hysterectomy.  No adnexal masses or tenderness.            Musculoskeletal: Normal range of motion and moves all extremeties.       Neurological: She is alert and oriented to person, place, and time.    Skin: Skin is warm and dry.    Psychiatric: She has a normal mood and affect.          Assessment:        1. Well woman exam with routine gynecological exam              Plan:    Routine annual exam today.  Discussed guidelines - pap smear not indicated.  Counseling done.  Precautions given.  All questions answered.  RTC 1 year for annual or prn.

## 2019-05-01 DIAGNOSIS — K28.9 JEJUNAL ULCER: ICD-10-CM

## 2019-05-01 RX ORDER — PANTOPRAZOLE SODIUM 40 MG/1
40 TABLET, DELAYED RELEASE ORAL
Qty: 90 TABLET | Refills: 1 | Status: SHIPPED | OUTPATIENT
Start: 2019-05-01 | End: 2019-05-24 | Stop reason: SDUPTHER

## 2019-05-21 DIAGNOSIS — K28.9 JEJUNAL ULCER: ICD-10-CM

## 2019-05-21 NOTE — TELEPHONE ENCOUNTER
----- Message from Melvi Flor sent at 5/21/2019  3:16 PM CDT -----  Contact: [facility]- [phone]  [facility]- pharmacy called to check the status of PA for the pts rx  pantoprazole (PROTONIX) 40 MG tablet - please contact pharmacy at [phone]

## 2019-05-24 RX ORDER — PANTOPRAZOLE SODIUM 40 MG/1
40 TABLET, DELAYED RELEASE ORAL
Qty: 90 TABLET | Refills: 1 | OUTPATIENT
Start: 2019-05-24 | End: 2019-11-20

## 2019-05-24 RX ORDER — PANTOPRAZOLE SODIUM 40 MG/1
40 TABLET, DELAYED RELEASE ORAL
Qty: 90 TABLET | Refills: 1 | Status: SHIPPED | OUTPATIENT
Start: 2019-05-24 | End: 2020-11-03

## 2020-05-16 ENCOUNTER — HOSPITAL ENCOUNTER (EMERGENCY)
Facility: HOSPITAL | Age: 65
Discharge: HOME | End: 2020-05-16
Attending: EMERGENCY MEDICINE
Payer: COMMERCIAL

## 2020-05-16 VITALS
RESPIRATION RATE: 16 BRPM | DIASTOLIC BLOOD PRESSURE: 80 MMHG | SYSTOLIC BLOOD PRESSURE: 164 MMHG | HEART RATE: 84 BPM | BODY MASS INDEX: 31.47 KG/M2 | HEIGHT: 62 IN | TEMPERATURE: 98 F | WEIGHT: 171 LBS | OXYGEN SATURATION: 97 %

## 2020-05-16 DIAGNOSIS — M25.562 PAIN AND SWELLING OF LEFT KNEE: ICD-10-CM

## 2020-05-16 DIAGNOSIS — S89.92XA LEFT KNEE INJURY, INITIAL ENCOUNTER: Primary | ICD-10-CM

## 2020-05-16 DIAGNOSIS — M25.462 PAIN AND SWELLING OF LEFT KNEE: ICD-10-CM

## 2020-05-16 PROCEDURE — 63600175 PHARM REV CODE 636 W HCPCS: Performed by: STUDENT IN AN ORGANIZED HEALTH CARE EDUCATION/TRAINING PROGRAM

## 2020-05-16 PROCEDURE — 96374 THER/PROPH/DIAG INJ IV PUSH: CPT

## 2020-05-16 PROCEDURE — 99284 EMERGENCY DEPT VISIT MOD MDM: CPT | Mod: ,,, | Performed by: EMERGENCY MEDICINE

## 2020-05-16 PROCEDURE — 99284 EMERGENCY DEPT VISIT MOD MDM: CPT | Mod: 25

## 2020-05-16 PROCEDURE — 99284 PR EMERGENCY DEPT VISIT,LEVEL IV: ICD-10-PCS | Mod: ,,, | Performed by: EMERGENCY MEDICINE

## 2020-05-16 RX ORDER — KETOROLAC TROMETHAMINE 30 MG/ML
10 INJECTION, SOLUTION INTRAMUSCULAR; INTRAVENOUS
Status: COMPLETED | OUTPATIENT
Start: 2020-05-16 | End: 2020-05-16

## 2020-05-16 RX ORDER — NAPROXEN 500 MG/1
500 TABLET ORAL 2 TIMES DAILY WITH MEALS
Qty: 14 TABLET | Refills: 0 | Status: SHIPPED | OUTPATIENT
Start: 2020-05-16 | End: 2020-05-23

## 2020-05-16 RX ORDER — FAMOTIDINE 20 MG/1
20 TABLET, FILM COATED ORAL 2 TIMES DAILY
Qty: 60 TABLET | Refills: 0 | Status: SHIPPED | OUTPATIENT
Start: 2020-05-16 | End: 2020-11-03

## 2020-05-16 RX ADMIN — KETOROLAC TROMETHAMINE 10 MG: 30 INJECTION, SOLUTION INTRAMUSCULAR at 04:05

## 2020-05-16 NOTE — DISCHARGE INSTRUCTIONS
You were seen in the [facility]. Your final diagnosis is left knee injury with mild effusion (small amount of fluid on the knee).     Please call your primary care physician or return to the Emergency Department if you have any loss of consciousness, bleeding, nausea, vomiting, fever >100.4, confusion, difficulty breathing, weakness, numbness, inability to walk, or any other concerning symptoms. The emergency department is always open and happy to see you for your concerns.

## 2020-05-16 NOTE — ED PROVIDER NOTES
Encounter Date: 5/16/2020       History     Chief Complaint   Patient presents with    Knee Pain     Injured when walking down stairs. Denies falling.      HPI   64 year old with HTN and thyroid disease presenting with left knee pain.  Patient reports the pain began this morning when she was coming down the stairs.  She states that she was excited and jumped from the last stair landing on her left foot and feeling immediate pain and swelling in her left knee.  She is able to bear weight but experiences severe pain.  She had a pair of crutches at home that she has been using to ambulate.  She denies any numbness, tingling, or weakness of the left leg distal to the knee.  She is able to bend the knee to 90° without severe pain.  She took Tylenol for pain which she said took the edge off.  She reports history of gastric ulcers which is why she is not taking NSAIDs.    Review of patient's allergies indicates:  No Known Allergies  Past Medical History:   Diagnosis Date    Hypertension     Thyroid disease      Past Surgical History:   Procedure Laterality Date    CHOLECYSTECTOMY      COLONOSCOPY N/A 5/11/2018    Procedure: COLONOSCOPY;  Surgeon: Scout Shannon MD;  Location: [address];  Service: Endoscopy;  Laterality: N/A;    EXPLORATORY LAPAROTOMY W/ BOWEL RESECTION      GASTRIC BYPASS      HERNIA REPAIR      HIATAL HERNIA REPAIR      HYSTERECTOMY       History reviewed. No pertinent family history.  Social History     Tobacco Use    Smoking status: Former Smoker     Types: Cigarettes    Smokeless tobacco: Never Used   Substance Use Topics    Alcohol use: Yes     Alcohol/week: 0.0 standard drinks    Drug use: No     Review of Systems   Constitutional: Negative for chills and fever.   HENT: Negative for sore throat.    Respiratory: Negative for shortness of breath.    Cardiovascular: Negative for chest pain and palpitations.   Gastrointestinal: Negative for abdominal pain, diarrhea, nausea and  vomiting.   Genitourinary: Negative for decreased urine volume and dysuria.   Musculoskeletal: Positive for arthralgias (L knee pain) and joint swelling (L knee swelling.). Negative for back pain and myalgias.   Skin: Negative for rash.   Neurological: Negative for dizziness, seizures, syncope, weakness, light-headedness and headaches.   Hematological: Does not bruise/bleed easily.   Psychiatric/Behavioral: The patient is not nervous/anxious.        Physical Exam     Initial Vitals [05/16/20 1529]   BP Pulse Resp Temp SpO2   (!) 164/80 84 16 98.4 °F (36.9 °C) 97 %      MAP       --         Physical Exam    Nursing note and vitals reviewed.  Constitutional: She appears well-developed and well-nourished. She is not diaphoretic. No distress.   HENT:   Head: Normocephalic and atraumatic.   Nose: Nose normal.   Mouth/Throat: Oropharynx is clear and moist. No oropharyngeal exudate.   Eyes: Conjunctivae and EOM are normal. Pupils are equal, round, and reactive to light. No scleral icterus.   Neck: Normal range of motion. Neck supple. No tracheal deviation present.   Cardiovascular: Normal rate, regular rhythm, normal heart sounds and intact distal pulses.   Pulmonary/Chest: Breath sounds normal. No stridor. No respiratory distress.   Abdominal: Soft. She exhibits no distension and no mass. There is no tenderness. There is no guarding.   Musculoskeletal: She exhibits tenderness.   Tenderness and mild effusion to the left knee.  Patient able to fully extend the leg and been the need to 90° without assistance.  Full range of motion of the left foot distal to the site of injury.  Plus two DP pulses.  No edema distal to the injury.  Motor and sensory intact.   Neurological: She is alert and oriented to person, place, and time. She has normal strength. No cranial nerve deficit. GCS score is 15. GCS eye subscore is 4. GCS verbal subscore is 5. GCS motor subscore is 6.   Skin: Skin is warm and dry. Capillary refill takes less than  2 seconds. No erythema. No pallor.   Psychiatric: She has a normal mood and affect. Her behavior is normal. Judgment and thought content normal.         ED Course   Procedures  Labs Reviewed - No data to display       Imaging Results          X-Ray Knee 3 View Left (Final result)  Result time 05/16/20 16:55:43    Final result by Kem Clayton MD (05/16/20 16:55:43)                 Impression:      1. No acute displaced fracture or dislocation of the knee noting mild suprapatellar effusion.      Electronically signed by: Kem Clayton MD  Date:    05/16/2020  Time:    16:55             Narrative:    EXAMINATION:  XR KNEE 3 VIEW LEFT    CLINICAL HISTORY:  Pain in left knee    TECHNIQUE:  AP, lateral, and Merchant views of the left knee were performed.    COMPARISON:  04/06/2012    FINDINGS:  Three views left knee.    No acute displaced fracture or dislocation of the knee.  No radiopaque foreign body.  There is a mild suprapatellar effusion.  No radiopaque foreign body.                                 Medical Decision Making:   History:   Old Medical Records: I decided to obtain old medical records.  Old Records Summarized: records from clinic visits.  Clinical Tests:   Radiological Study: Ordered and Reviewed  Other:   I have discussed this case with another health care provider.       APC / Resident Notes:   HO-5 MDM  64 year old with HTN and thyroid disease presenting with left knee pain.  Patient reports the pain began this morning when she was coming down the stairs.    DDx:  Left knee contusion, patellar dislocation, patellar fracture, tibial plateau fracture    A/P:  Based on history and physical exam, I have low suspicion that this patient has suffered from any fracture or dislocation.  We obtain x-ray to assess for bony abnormality that was read as normal except for a small effusion on the upper portion of the knee.  The patient had significant improvement in pain with Toradol and was able to ambulate  with assistance.  Will plan to discharge the patient on naproxen and Pepcid given her history of gastric ulcers.  Patient is agreeable to this plan.  I also refer the patient to orthopedics for re-evaluation in 2 weeks.  Patient was given strict return precautions and instructed to follow-up with the primary care physician.      Ezra Julio MD, PGY-5  [facility] Emergency Medicine/Pediatrics Resident  5:53 PM 05/16/2020             Attending Attestation:   Physician Attestation Statement for Resident:  As the supervising MD   Physician Attestation Statement: I have personally seen and examined this patient.   I agree with the above history. -:   As the supervising MD I agree with the above PE.    As the supervising MD I agree with the above treatment, course, plan, and disposition.   -: XR neg for fx, concern for occult fx such as tibial plateau low as pt able to bear weight after NSAID, however will place in knee immobilizer with limited WBAT pending further outpt eval by Ortho  I have reviewed and agree with the residents interpretation of the following: x-rays.  I have reviewed the following: old records at this facility.                                  Clinical Impression:       ICD-10-CM ICD-9-CM   1. Left knee injury, initial encounter S89.92XA 959.7   2. Pain and swelling of left knee M25.562 719.46    M25.462 719.06                                Ezra Julio MD  Resident  05/16/20 175       Dayana Mercado MD  05/18/20 0961

## 2020-05-16 NOTE — ED TRIAGE NOTES
"Pt presents to the ED c/o left knee pain that began today while running down the stairs. Feels like it "rolled." Knee brace applied PTA and applied ice. Tylenol @ 1230.  "

## 2020-05-20 ENCOUNTER — TELEPHONE (OUTPATIENT)
Dept: ORTHOPEDICS | Facility: CLINIC | Age: 65
End: 2020-05-20

## 2020-05-20 NOTE — TELEPHONE ENCOUNTER
Ortho Telephone Triage Message [phone]  LVM requesting pt return call regarding Ortho appt for L knee pain/swelling per Dr. Julio/[facility] ED referral.

## 2020-05-20 NOTE — TELEPHONE ENCOUNTER
Ortho Telephone Triage Message Follow Up: [phone]  Appt scheduled tomorrow, 5/21/20, with GONZALO Marshall PA-C/Ortho Clinic at 4:00pm for L knee pain/swelling per Dr. King ED referral. Pt confirms time and location of appt and active in [facility].

## 2020-05-21 ENCOUNTER — HOSPITAL ENCOUNTER (OUTPATIENT)
Dept: RADIOLOGY | Facility: HOSPITAL | Age: 65
Discharge: HOME | End: 2020-05-21
Attending: PHYSICIAN ASSISTANT
Payer: COMMERCIAL

## 2020-05-21 ENCOUNTER — OFFICE VISIT (OUTPATIENT)
Dept: ORTHOPEDICS | Facility: CLINIC | Age: 65
End: 2020-05-21
Payer: COMMERCIAL

## 2020-05-21 VITALS
DIASTOLIC BLOOD PRESSURE: 72 MMHG | SYSTOLIC BLOOD PRESSURE: 125 MMHG | BODY MASS INDEX: 31.29 KG/M2 | TEMPERATURE: 97 F | WEIGHT: 171.06 LBS | HEART RATE: 60 BPM

## 2020-05-21 DIAGNOSIS — M25.462 PAIN AND SWELLING OF LEFT KNEE: ICD-10-CM

## 2020-05-21 DIAGNOSIS — M25.562 PAIN AND SWELLING OF LEFT KNEE: ICD-10-CM

## 2020-05-21 PROCEDURE — 73560 XR KNEE 1 OR 2 VIEW BILATERAL: ICD-10-PCS | Mod: 26,,, | Performed by: RADIOLOGY

## 2020-05-21 PROCEDURE — 3008F BODY MASS INDEX DOCD: CPT | Mod: CPTII,S$GLB,, | Performed by: PHYSICIAN ASSISTANT

## 2020-05-21 PROCEDURE — 99999 PR PBB SHADOW E&M-EST. PATIENT-LVL IV: ICD-10-PCS | Mod: PBBFAC,,, | Performed by: PHYSICIAN ASSISTANT

## 2020-05-21 PROCEDURE — 99203 PR OFFICE/OUTPT VISIT, NEW, LEVL III, 30-44 MIN: ICD-10-PCS | Mod: S$GLB,,, | Performed by: PHYSICIAN ASSISTANT

## 2020-05-21 PROCEDURE — 73560 X-RAY EXAM OF KNEE 1 OR 2: CPT | Mod: TC,50

## 2020-05-21 PROCEDURE — 99999 PR PBB SHADOW E&M-EST. PATIENT-LVL IV: CPT | Mod: PBBFAC,,, | Performed by: PHYSICIAN ASSISTANT

## 2020-05-21 PROCEDURE — 99203 OFFICE O/P NEW LOW 30 MIN: CPT | Mod: S$GLB,,, | Performed by: PHYSICIAN ASSISTANT

## 2020-05-21 PROCEDURE — 73560 X-RAY EXAM OF KNEE 1 OR 2: CPT | Mod: 26,,, | Performed by: RADIOLOGY

## 2020-05-21 PROCEDURE — 3008F PR BODY MASS INDEX (BMI) DOCUMENTED: ICD-10-PCS | Mod: CPTII,S$GLB,, | Performed by: PHYSICIAN ASSISTANT

## 2020-05-21 RX ORDER — METHYLPREDNISOLONE 4 MG/1
TABLET ORAL
Qty: 1 PACKAGE | Refills: 0 | Status: SHIPPED | OUTPATIENT
Start: 2020-05-21 | End: 2020-11-03

## 2020-05-21 NOTE — PROGRESS NOTES
Subjective:      Patient ID: Lulu Gonzalez is a 64 y.o. female.    Chief Complaint: Pain, Swelling, and Injury of the Left Knee    HPI  64 year old female presents with chief complaint of left knee pain x 5 days. She was coming down steps at her house and she hopped to the next step and came down hard on the left foot and felt a pain at the knee. She went to the ED and x-rays were negative. She was given a knee immobilizer which she has been wearing with most of her WB. She has also been using crutches at times. She has been taking naproxen with some relief. She denies pain. She reports a discomfort at the anterior knee. She denies pain with WB. She reports some swelling and bruising. She has been icing. She has h/o ulcers. She had left knee scope about 28 years ago.   Review of Systems   Constitution: Negative for chills, fever and night sweats.   Cardiovascular: Negative for chest pain.   Respiratory: Negative for cough and shortness of breath.    Hematologic/Lymphatic: Does not bruise/bleed easily.   Gastrointestinal: Negative for heartburn.   Genitourinary: Negative for dysuria.   Neurological: Negative for numbness and paresthesias.   Psychiatric/Behavioral: Negative for altered mental status.   Allergic/Immunologic: Negative for persistent infections.         Objective:            Ortho/SPM Exam  General :   alert, appears stated age and cooperative   Gait: Antalgic. The patient can bear weight on the injured extremity.   Left Lower Extremity  Hip Palpation:  no tenderness over the greater  trochanter   Hip ROM: 100% of normal    Knee Effusion:  None.   Ecchymosis:  anterior knee and lower leg   Knee ROM:  0 to 110 degrees without subpatellar   crepitance.   Patella:  Patella does track normally.  Patellar apprehension test: negative  Patellar compression test: negative   Tenderness: medial joint line   Stability:  Lachman's test: negative  Posterior drawer: negative  Medial collateral ligament:  negative  Lateral collateral ligament: negative         Chris's Test:  negative with no joint line tenderness   Sensation:   intact to light touch   Pulses: normal DP and PT pulses           X-ray: ordered and reviewed by myself.   Minimal narrowing of the left medial tibiofemoral joint space, preserved left lateral tibiofemoral joint space and preserved right medial and lateral tibiofemoral joint spaces.  No significant periarticular spurring.  No acute fractures or bone lesions.               Assessment:       Encounter Diagnosis   Name Primary?    Pain and swelling of left knee           Plan:       Discussed treatment options with patient. She will rest and elevate. Alternate ice and heat. She will d/c naproxen. Medrol dose pack sent to pharmacy. She can also take tylenol. She will d/c knee immobilizer. Wean from crutches as pain allows. RTC if symptoms worsen or do not improve.

## 2020-05-21 NOTE — LETTER
May 21, 2020      Ezra Julio MD  [address]  [facility] [address]           [facility]  [address]  [facility] [address]  Phone: [phone]          Patient: Lulu Gonzalez   MR Number: [id number]   YOB: 1955   Date of Visit: 5/21/2020       Dear Dr. Ezra Julio:    Thank you for referring Lulu Gonzalez to me for evaluation. Attached you will find relevant portions of my assessment and plan of care.    If you have questions, please do not hesitate to call me. I look forward to following Lulu Gonzalez along with you.    Sincerely,    Shara Marshall PA-C    Enclosure  CC:  No Recipients    If you would like to receive this communication electronically, please contact [email or web address] or [phone] to request more information on [employer] Link access.    For providers and/or their staff who would like to refer a patient to [facility], please contact us through our one-stop-shop provider referral line, [facility], at [phone].    If you feel you have received this communication in error or would no longer like to receive these types of communications, please e-mail [email or web address]

## 2020-11-03 ENCOUNTER — OFFICE VISIT (OUTPATIENT)
Dept: DERMATOLOGY | Facility: CLINIC | Age: 65
End: 2020-11-03
Payer: COMMERCIAL

## 2020-11-03 DIAGNOSIS — L98.9 DISEASE OF SKIN AND SUBCUTANEOUS TISSUE: Primary | ICD-10-CM

## 2020-11-03 PROCEDURE — 99202 OFFICE O/P NEW SF 15 MIN: CPT | Mod: 25,S$GLB,, | Performed by: DERMATOLOGY

## 2020-11-03 PROCEDURE — 88305 TISSUE EXAM BY PATHOLOGIST: CPT | Performed by: PATHOLOGY

## 2020-11-03 PROCEDURE — 99202 PR OFFICE/OUTPT VISIT, NEW, LEVL II, 15-29 MIN: ICD-10-PCS | Mod: 25,S$GLB,, | Performed by: DERMATOLOGY

## 2020-11-03 PROCEDURE — 99999 PR PBB SHADOW E&M-EST. PATIENT-LVL III: ICD-10-PCS | Mod: PBBFAC,,, | Performed by: DERMATOLOGY

## 2020-11-03 PROCEDURE — 99999 PR PBB SHADOW E&M-EST. PATIENT-LVL III: CPT | Mod: PBBFAC,,, | Performed by: DERMATOLOGY

## 2020-11-03 PROCEDURE — 88305 TISSUE EXAM BY PATHOLOGIST: ICD-10-PCS | Mod: 26,,, | Performed by: PATHOLOGY

## 2020-11-03 PROCEDURE — 11104 PR PUNCH BIOPSY, SKIN, SINGLE LESION: ICD-10-PCS | Mod: S$GLB,,, | Performed by: DERMATOLOGY

## 2020-11-03 PROCEDURE — 11104 PUNCH BX SKIN SINGLE LESION: CPT | Mod: S$GLB,,, | Performed by: DERMATOLOGY

## 2020-11-03 PROCEDURE — 88305 TISSUE EXAM BY PATHOLOGIST: CPT | Mod: 26,,, | Performed by: PATHOLOGY

## 2020-11-03 RX ORDER — LOSARTAN POTASSIUM AND HYDROCHLOROTHIAZIDE 12.5; 1 MG/1; MG/1
1 TABLET ORAL DAILY
COMMUNITY
Start: 2020-10-02 | End: 2021-11-23 | Stop reason: SDUPTHER

## 2020-11-03 RX ORDER — TIZANIDINE 4 MG/1
TABLET ORAL
COMMUNITY
Start: 2020-10-20 | End: 2021-11-02

## 2020-11-03 RX ORDER — BETAMETHASONE DIPROPIONATE 0.5 MG/G
CREAM TOPICAL
Qty: 60 G | Refills: 3 | Status: SHIPPED | OUTPATIENT
Start: 2020-11-03 | End: 2021-12-14

## 2020-11-03 NOTE — PATIENT INSTRUCTIONS

## 2020-11-03 NOTE — PROGRESS NOTES
Subjective:       Patient ID:  Lulu Gonzalez is a 64 y.o. female who presents for   Chief Complaint   Patient presents with    Rash     Rash - Initial  Affected locations: left lower leg  Duration: 2 months  Signs / symptoms: itching  Severity: moderate  Timing: no history of same complaint and constant  Aggravated by: nothing  Relieving factors/Treatments tried: nothing        Review of Systems   Skin: Positive for itching and rash.   Hematologic/Lymphatic: Bruises/bleeds easily (bruises).   Allergic/Immunologic: Positive for environmental allergies.        Objective:    Physical Exam   Constitutional: She appears well-developed and well-nourished. No distress.   HENT:   Mouth/Throat: Gums normal.  Lips normal.  Normal dentition.   Neurological: She is alert and oriented to person, place, and time. She is not disoriented.   Psychiatric: She has a normal mood and affect.   Skin:   Areas Examined (abnormalities noted in diagram):   Back Inspection Performed  RUE Inspected  LUE Inspection Performed  RLE Inspected  LLE Inspection Performed              Diagram Legend     Erythematous scaling macule/papule c/w actinic keratosis       Vascular papule c/w angioma      Pigmented verrucoid papule/plaque c/w seborrheic keratosis      Yellow umbilicated papule c/w sebaceous hyperplasia      Irregularly shaped tan macule c/w lentigo     1-2 mm smooth white papules consistent with Milia      Movable subcutaneous cyst with punctum c/w epidermal inclusion cyst      Subcutaneous movable cyst c/w pilar cyst      Firm pink to brown papule c/w dermatofibroma      Pedunculated fleshy papule(s) c/w skin tag(s)      Evenly pigmented macule c/w junctional nevus     Mildly variegated pigmented, slightly irregular-bordered macule c/w mildly atypical nevus      Flesh colored to evenly pigmented papule c/w intradermal nevus       Pink pearly papule/plaque c/w basal cell carcinoma      Erythematous hyperkeratotic cursted plaque c/w  SCC      Surgical scar with no sign of skin cancer recurrence      Open and closed comedones      Inflammatory papules and pustules      Verrucoid papule consistent consistent with wart     Erythematous eczematous patches and plaques     Dystrophic onycholytic nail with subungual debris c/w onychomycosis     Umbilicated papule    Erythematous-base heme-crusted tan verrucoid plaque consistent with inflamed seborrheic keratosis     Erythematous Silvery Scaling Plaque c/w Psoriasis     See annotation              Assessment / Plan:      Pathology Orders:     Normal Orders This Visit    Specimen to Pathology, Dermatology     Questions:    Procedure Type: Dermatology and skin neoplasms    Number of Specimens: 1    ------------------------: -------------------------    Spec 1 Procedure: Biopsy    Spec 1 Clinical Impression: r/o LP    Spec 1 Source: left lower leg        Disease of skin and subcutaneous tissue  -     betamethasone dipropionate 0.05 % cream; AAA lower leg bid  Dispense: 60 g; Refill: 3  -     Specimen to Pathology, Dermatology    Punch biopsy procedure note:  Punch biopsy performed after verbal consent obtained. Area marked and prepped with alcohol. Approximately 1cc of 1% lidocaine with epinephrine injected. 4 mm disposable punch used to remove lesion. Hemostasis obtained and biopsy site closed with 1 - 2 Prolene sutures. Wound care instructions reviewed with patient and handout given.             Follow up in about 2 weeks (around 11/17/2020).

## 2020-11-09 LAB
FINAL PATHOLOGIC DIAGNOSIS: NORMAL
GROSS: NORMAL
MICROSCOPIC EXAM: NORMAL

## 2020-11-17 ENCOUNTER — OFFICE VISIT (OUTPATIENT)
Dept: DERMATOLOGY | Facility: CLINIC | Age: 65
End: 2020-11-17
Payer: COMMERCIAL

## 2020-11-17 DIAGNOSIS — L43.9 LICHEN PLANUS: Primary | ICD-10-CM

## 2020-11-17 PROCEDURE — 99213 PR OFFICE/OUTPT VISIT, EST, LEVL III, 20-29 MIN: ICD-10-PCS | Mod: 25,S$GLB,, | Performed by: DERMATOLOGY

## 2020-11-17 PROCEDURE — 96372 THER/PROPH/DIAG INJ SC/IM: CPT | Mod: S$GLB,,, | Performed by: DERMATOLOGY

## 2020-11-17 PROCEDURE — 1126F PR PAIN SEVERITY QUANTIFIED, NO PAIN PRESENT: ICD-10-PCS | Mod: S$GLB,,, | Performed by: DERMATOLOGY

## 2020-11-17 PROCEDURE — 96372 PR INJECTION,THERAP/PROPH/DIAG2ST, IM OR SUBCUT: ICD-10-PCS | Mod: S$GLB,,, | Performed by: DERMATOLOGY

## 2020-11-17 PROCEDURE — 99999 PR PBB SHADOW E&M-EST. PATIENT-LVL III: CPT | Mod: PBBFAC,,, | Performed by: DERMATOLOGY

## 2020-11-17 PROCEDURE — 99213 OFFICE O/P EST LOW 20 MIN: CPT | Mod: 25,S$GLB,, | Performed by: DERMATOLOGY

## 2020-11-17 PROCEDURE — 99999 PR PBB SHADOW E&M-EST. PATIENT-LVL III: ICD-10-PCS | Mod: PBBFAC,,, | Performed by: DERMATOLOGY

## 2020-11-17 PROCEDURE — 1126F AMNT PAIN NOTED NONE PRSNT: CPT | Mod: S$GLB,,, | Performed by: DERMATOLOGY

## 2020-11-17 RX ADMIN — BETAMETHASONE SODIUM PHOSPHATE AND BETAMETHASONE ACETATE 6 MG: 3; 3 INJECTION, SUSPENSION INTRA-ARTICULAR; INTRALESIONAL; INTRAMUSCULAR; SOFT TISSUE at 09:11

## 2020-11-17 NOTE — ASSESSMENT & PLAN NOTE
Today's Plan:      1cc Celestone deep IM today and qweek x 3  Cont beta dip cream bid    F/u 1 week after last celestone injection

## 2020-11-17 NOTE — PROGRESS NOTES
Subjective:       Patient ID:  Lulu Gonzalez is a 64 y.o. female who presents for   Chief Complaint   Patient presents with    Rash     Here for F/U. bx done 11/3/2020:  Skin, left lower leg, punch biopsy:   -LICHENOID DERMATITIS, see comment   COMMENT:  The histological findings (see microscopic description) can be seen   in lichen planus or a lichenoid drug eruption (given the rare scattered   eosinophils).  Clinical correlation is recommended.    Less itchy. Using beta dip cream bid. New areas on right lower leg    No new meds      Review of Systems   Skin: Positive for itching (less) and rash.   Hematologic/Lymphatic: Bruises/bleeds easily (bruises).   Allergic/Immunologic: Positive for environmental allergies.        Objective:    Physical Exam   Constitutional: She appears well-developed and well-nourished. No distress.   HENT:   Mouth/Throat: Gums normal.  Lips normal.  Normal dentition.   Neurological: She is alert and oriented to person, place, and time. She is not disoriented.   Psychiatric: She has a normal mood and affect.   Skin:   Areas Examined (abnormalities noted in diagram):   Back Inspection Performed  RUE Inspected  LUE Inspection Performed  RLE Inspected  LLE Inspection Performed              Diagram Legend     Erythematous scaling macule/papule c/w actinic keratosis       Vascular papule c/w angioma      Pigmented verrucoid papule/plaque c/w seborrheic keratosis      Yellow umbilicated papule c/w sebaceous hyperplasia      Irregularly shaped tan macule c/w lentigo     1-2 mm smooth white papules consistent with Milia      Movable subcutaneous cyst with punctum c/w epidermal inclusion cyst      Subcutaneous movable cyst c/w pilar cyst      Firm pink to brown papule c/w dermatofibroma      Pedunculated fleshy papule(s) c/w skin tag(s)      Evenly pigmented macule c/w junctional nevus     Mildly variegated pigmented, slightly irregular-bordered macule c/w mildly atypical nevus      Flesh  colored to evenly pigmented papule c/w intradermal nevus       Pink pearly papule/plaque c/w basal cell carcinoma      Erythematous hyperkeratotic cursted plaque c/w SCC      Surgical scar with no sign of skin cancer recurrence      Open and closed comedones      Inflammatory papules and pustules      Verrucoid papule consistent consistent with wart     Erythematous eczematous patches and plaques     Dystrophic onycholytic nail with subungual debris c/w onychomycosis     Umbilicated papule    Erythematous-base heme-crusted tan verrucoid plaque consistent with inflamed seborrheic keratosis     Erythematous Silvery Scaling Plaque c/w Psoriasis     See annotation      Assessment / Plan:        Lichen planus      Lichen planus  Today's Plan:      1cc Celestone deep IM today and qweek x 3  Cont beta dip cream bid  LP brochure given    F/u 1 week after last celestone injection      No follow-ups on file.

## 2020-11-18 RX ORDER — BETAMETHASONE SODIUM PHOSPHATE AND BETAMETHASONE ACETATE 3; 3 MG/ML; MG/ML
6 INJECTION, SUSPENSION INTRA-ARTICULAR; INTRALESIONAL; INTRAMUSCULAR; SOFT TISSUE ONCE
Status: COMPLETED | OUTPATIENT
Start: 2020-11-18 | End: 2020-11-17

## 2020-11-18 NOTE — PROGRESS NOTES
After obtaining consent, and per orders of Dr. Goldstein, injection of Celestone 6mg/ml given deep IM in the left gluteus kristy by Meme Cruz . Patient instructed to remain in clinic for 20 minutes afterwards, and to report any adverse reaction to me immediately. Lot [id number], Exp 06/04/2021, NDC [id number].

## 2020-11-24 ENCOUNTER — CLINICAL SUPPORT (OUTPATIENT)
Dept: DERMATOLOGY | Facility: CLINIC | Age: 65
End: 2020-11-24
Payer: COMMERCIAL

## 2020-11-24 DIAGNOSIS — L43.9 LICHEN PLANUS: Primary | ICD-10-CM

## 2020-11-24 PROCEDURE — 96372 PR INJECTION,THERAP/PROPH/DIAG2ST, IM OR SUBCUT: ICD-10-PCS | Mod: S$GLB,,, | Performed by: DERMATOLOGY

## 2020-11-24 PROCEDURE — 99499 UNLISTED E&M SERVICE: CPT | Mod: S$GLB,,, | Performed by: DERMATOLOGY

## 2020-11-24 PROCEDURE — 99499 NO LOS: ICD-10-PCS | Mod: S$GLB,,, | Performed by: DERMATOLOGY

## 2020-11-24 PROCEDURE — 96372 THER/PROPH/DIAG INJ SC/IM: CPT | Mod: S$GLB,,, | Performed by: DERMATOLOGY

## 2020-11-24 RX ORDER — BETAMETHASONE SODIUM PHOSPHATE AND BETAMETHASONE ACETATE 3; 3 MG/ML; MG/ML
6 INJECTION, SUSPENSION INTRA-ARTICULAR; INTRALESIONAL; INTRAMUSCULAR; SOFT TISSUE ONCE
Status: COMPLETED | OUTPATIENT
Start: 2020-11-24 | End: 2020-11-24

## 2020-11-24 RX ADMIN — BETAMETHASONE SODIUM PHOSPHATE AND BETAMETHASONE ACETATE 6 MG: 3; 3 INJECTION, SUSPENSION INTRA-ARTICULAR; INTRALESIONAL; INTRAMUSCULAR; SOFT TISSUE at 08:11

## 2020-11-24 NOTE — PROGRESS NOTES
After obtaining consent, and per orders of Dr. Goldstein, injection of Celestone 6mg/ml given deep IM in the right gluteus kristy  by Meme Cruz . Patient instructed to remain in clinic for 20 minutes afterwards, and to report any adverse reaction to me immediately. Lot [id number], Exp 06/04/2021, NDC [id number].

## 2020-12-01 ENCOUNTER — CLINICAL SUPPORT (OUTPATIENT)
Dept: DERMATOLOGY | Facility: CLINIC | Age: 65
End: 2020-12-01
Payer: COMMERCIAL

## 2020-12-01 DIAGNOSIS — L43.9 LICHEN PLANUS: Primary | ICD-10-CM

## 2020-12-01 PROCEDURE — 96372 THER/PROPH/DIAG INJ SC/IM: CPT | Mod: S$GLB,,, | Performed by: DERMATOLOGY

## 2020-12-01 PROCEDURE — 96372 PR INJECTION,THERAP/PROPH/DIAG2ST, IM OR SUBCUT: ICD-10-PCS | Mod: S$GLB,,, | Performed by: DERMATOLOGY

## 2020-12-01 PROCEDURE — 99499 UNLISTED E&M SERVICE: CPT | Mod: S$GLB,,, | Performed by: DERMATOLOGY

## 2020-12-01 PROCEDURE — 99499 NO LOS: ICD-10-PCS | Mod: S$GLB,,, | Performed by: DERMATOLOGY

## 2020-12-01 RX ORDER — BETAMETHASONE SODIUM PHOSPHATE AND BETAMETHASONE ACETATE 3; 3 MG/ML; MG/ML
6 INJECTION, SUSPENSION INTRA-ARTICULAR; INTRALESIONAL; INTRAMUSCULAR; SOFT TISSUE
Status: COMPLETED | OUTPATIENT
Start: 2020-12-01 | End: 2020-12-01

## 2020-12-01 RX ADMIN — BETAMETHASONE SODIUM PHOSPHATE AND BETAMETHASONE ACETATE 6 MG: 3; 3 INJECTION, SUSPENSION INTRA-ARTICULAR; INTRALESIONAL; INTRAMUSCULAR; SOFT TISSUE at 08:12

## 2020-12-01 NOTE — PROGRESS NOTES
After obtaining consent, and per orders of Dr. Goldstein  injection of celestone 6mg/ml given deep IM in the left gluteus kristy by Harper Mustafa. Patient instructed to remain in clinic for 20 minutes afterwards, and to report any adverse reaction to me immediately. Lot number is [id number] exp. 6/4/2021.

## 2020-12-11 ENCOUNTER — OFFICE VISIT (OUTPATIENT)
Dept: OTOLARYNGOLOGY | Facility: CLINIC | Age: 65
End: 2020-12-11
Payer: COMMERCIAL

## 2020-12-11 ENCOUNTER — CLINICAL SUPPORT (OUTPATIENT)
Dept: AUDIOLOGY | Facility: CLINIC | Age: 65
End: 2020-12-11
Payer: COMMERCIAL

## 2020-12-11 VITALS — WEIGHT: 182.13 LBS | BODY MASS INDEX: 33.31 KG/M2

## 2020-12-11 DIAGNOSIS — H90.3 SENSORINEURAL HEARING LOSS, BILATERAL: Primary | ICD-10-CM

## 2020-12-11 DIAGNOSIS — H90.3 SENSORINEURAL HEARING LOSS (SNHL) OF BOTH EARS: Primary | ICD-10-CM

## 2020-12-11 PROCEDURE — 99999 PR PBB SHADOW E&M-EST. PATIENT-LVL III: ICD-10-PCS | Mod: PBBFAC,,, | Performed by: OTOLARYNGOLOGY

## 2020-12-11 PROCEDURE — 92557 PR COMPREHENSIVE HEARING TEST: ICD-10-PCS | Mod: S$GLB,,, | Performed by: AUDIOLOGIST

## 2020-12-11 PROCEDURE — 92567 TYMPANOMETRY: CPT | Mod: S$GLB,,, | Performed by: AUDIOLOGIST

## 2020-12-11 PROCEDURE — 92557 COMPREHENSIVE HEARING TEST: CPT | Mod: S$GLB,,, | Performed by: AUDIOLOGIST

## 2020-12-11 PROCEDURE — 99999 PR PBB SHADOW E&M-EST. PATIENT-LVL III: CPT | Mod: PBBFAC,,, | Performed by: OTOLARYNGOLOGY

## 2020-12-11 PROCEDURE — 99203 PR OFFICE/OUTPT VISIT, NEW, LEVL III, 30-44 MIN: ICD-10-PCS | Mod: S$GLB,,, | Performed by: OTOLARYNGOLOGY

## 2020-12-11 PROCEDURE — 3008F BODY MASS INDEX DOCD: CPT | Mod: CPTII,S$GLB,, | Performed by: OTOLARYNGOLOGY

## 2020-12-11 PROCEDURE — 3008F PR BODY MASS INDEX (BMI) DOCUMENTED: ICD-10-PCS | Mod: CPTII,S$GLB,, | Performed by: OTOLARYNGOLOGY

## 2020-12-11 PROCEDURE — 1126F AMNT PAIN NOTED NONE PRSNT: CPT | Mod: S$GLB,,, | Performed by: OTOLARYNGOLOGY

## 2020-12-11 PROCEDURE — 99203 OFFICE O/P NEW LOW 30 MIN: CPT | Mod: S$GLB,,, | Performed by: OTOLARYNGOLOGY

## 2020-12-11 PROCEDURE — 1126F PR PAIN SEVERITY QUANTIFIED, NO PAIN PRESENT: ICD-10-PCS | Mod: S$GLB,,, | Performed by: OTOLARYNGOLOGY

## 2020-12-11 PROCEDURE — 92567 PR TYMPA2METRY: ICD-10-PCS | Mod: S$GLB,,, | Performed by: AUDIOLOGIST

## 2020-12-11 NOTE — LETTER
December 11, 2020      Reginaldo Presley MD  [facility]  [facility] [address]           [facility]  [address]  [facility] [address]  Phone: [phone]  Fax: [phone]          Patient: Lulu Gonzalez   MR Number: [id number]   YOB: 1955   Date of Visit: 12/11/2020       Dear Dr. Reginaldo Presley:    Thank you for referring Lulu Gonzalez to me for evaluation. Attached you will find relevant portions of my assessment and plan of care.    If you have questions, please do not hesitate to call me. I look forward to following Lulu Gonzalez along with you.    Sincerely,    Jesus Mandujano MD    Enclosure  CC:  No Recipients    If you would like to receive this communication electronically, please contact [email or web address] or [phone] to request more information on [employer] Link access.    For providers and/or their staff who would like to refer a patient to [facility], please contact us through our one-stop-shop provider referral line, [facility], at [phone].    If you feel you have received this communication in error or would no longer like to receive these types of communications, please e-mail [email or web address]

## 2020-12-11 NOTE — PROGRESS NOTES
Otology/Neurotology History and Physical     CC: hearing loss    HPI:  Lulu Gonzalez is a 65 y.o. female who was referred to me by Dr. Reginaldo Presley in consultation for hearing loss. She notes that since people started wearing masks due to the pandemic, she realized she could not hear people well because she was reading lips. She also notes worsening hearing in noisy environments. She does report exposure to loud noises. She denies any otologic history including surgeries, frequent ear infections, otorrhea or otalgia. She states both her mother and daughter have a history of otosclerosis requiring surgery. She does report tinnitus which is worse in the evenings and quiet places. It is non-pulsatile.       Past Medical History  She has a past medical history of Ac marginal ulcer w perf, Hypertension, Jejunal ulcer, and Thyroid disease.    Past Surgical History  She has a past surgical history that includes Cholecystectomy; Gastric bypass; Hernia repair; Hiatal hernia repair; Hysterectomy; Exploratory laparotomy w/ bowel resection; and Colonoscopy (N/A, 5/11/2018).    Family History  Her family history is not on file.    Social History  She reports that she quit smoking about 21 years ago. Her smoking use included cigarettes. She started smoking about 43 years ago. She has a 15.00 pack-year smoking history. She has never used smokeless tobacco. She reports current alcohol use of about 2.0 standard drinks of alcohol per week. She reports that she does not use drugs.    Allergies  She has No Known Allergies.    Medications  She has a current medication list which includes the following prescription(s): betamethasone dipropionate, fluticasone propionate, irbesartan-hydrochlorothiazide, levothyroxine, lorazepam, losartan-hydrochlorothiazide 100-12.5 mg, pramipexole, raloxifene, and tizanidine.    Review of Systems   Constitutional: Negative.  Negative for chills, fatigue and fever.   HENT: Positive for hearing  loss and tinnitus. Negative for ear discharge, ear pain, trouble swallowing and voice change.    Eyes: Positive for itching.   Respiratory: Negative.  Negative for cough and stridor.    Cardiovascular: Negative.  Negative for chest pain.   Gastrointestinal: Positive for constipation and diarrhea. Negative for abdominal distention.   Endocrine: Negative.    Genitourinary: Negative.    Musculoskeletal: Negative for arthralgias.   Skin: Negative.    Allergic/Immunologic: Negative.    Neurological: Negative.    Hematological: Bruises/bleeds easily.   Psychiatric/Behavioral: Negative.           Objective:     Wt 82.6 kg (182 lb 1.6 oz)   BMI 33.31 kg/m²    Physical Exam     Constitutional: Well appearing/communicating. Voice clear. NAD.  Eyes: EOM I Bilaterally  Head/Face: Normocephalic.  House Brackmann I Bilaterally.  Right Ear: External Auditory Canal WNL,TM w/o masses/lesions/perforations.  Auricle WNL.  Left Ear: External Auditory Canal WNL,TM w/o masses/lesions/perforations. Auricle WNL.  Nose: No gross nasal septal deviation. Inferior Turbinates 2+ bilaterally. No septal perforation. No masses/lesions. External nasal skin without masses/lesions.  Oral Cavity: Gingiva/lips WNL. Oral Tongue mobile. Hard Palate WNL.   Oropharynx: Tonsillar fossa/pharyngeal wall without lesions. Posterior oropharynx WNL.  Soft palate without masses. Midline uvula.   Neck/Lymphatic: No LAD I-VI bilaterally.  No thyromegaly.  No masses noted on exam.  Neuro/Psychiatric: AOx3.  Normal mood and affect.   Respiratory: Normal respiratory effort, no stridor/stertor, no retractions noted.      Procedure    None        Data Reviewed               Assessment:     1. Sensorineural hearing loss, bilateral         Plan:   Mild-moderate bilat SNHL. Likely age and noise-exposure related. Counseled patient that hearing aids may be a good option for her. RTC prn      Answers for HPI/ROS submitted by the patient on 12/8/2020   heartburn: Yes  Muscle  aches / pain?: Yes

## 2020-12-11 NOTE — PROGRESS NOTES
Ms. Gonzalez was seen in the clinic today for a hearing evaluation. She reported she is experiencing difficulty hearing and understanding and constant bilateral tinnitus. She reported a history of otosclerosis in her mother and daughter.    Audiological testing revealed a mild to moderate sensorineural hearing loss in the right ear and a mild sensorineural hearing loss in the left ear. A speech reception threshold was obtained at 30 dBHL in the right ear and 25 dBHL in the left ear. Speech discrimination was 96%, bilaterally.    Tympanometry revealed Type A tympanograms, bilaterally.    Recommendations:  1. Otologic evaluation  2. Annual hearing evaluation  3. Noise protection  4. Hearing aid consultation, particularly if patient feels hearing loss is negatively impacting her quality of life

## 2021-01-15 ENCOUNTER — OFFICE VISIT (OUTPATIENT)
Dept: DERMATOLOGY | Facility: CLINIC | Age: 66
End: 2021-01-15
Payer: COMMERCIAL

## 2021-01-15 DIAGNOSIS — L43.9 LICHEN PLANUS: Primary | ICD-10-CM

## 2021-01-15 PROCEDURE — 99999 PR PBB SHADOW E&M-EST. PATIENT-LVL III: ICD-10-PCS | Mod: PBBFAC,,, | Performed by: DERMATOLOGY

## 2021-01-15 PROCEDURE — 1126F AMNT PAIN NOTED NONE PRSNT: CPT | Mod: S$GLB,,, | Performed by: DERMATOLOGY

## 2021-01-15 PROCEDURE — 99999 PR PBB SHADOW E&M-EST. PATIENT-LVL III: CPT | Mod: PBBFAC,,, | Performed by: DERMATOLOGY

## 2021-01-15 PROCEDURE — 99212 OFFICE O/P EST SF 10 MIN: CPT | Mod: S$GLB,,, | Performed by: DERMATOLOGY

## 2021-01-15 PROCEDURE — 99212 PR OFFICE/OUTPT VISIT, EST, LEVL II, 10-19 MIN: ICD-10-PCS | Mod: S$GLB,,, | Performed by: DERMATOLOGY

## 2021-01-15 PROCEDURE — 1126F PR PAIN SEVERITY QUANTIFIED, NO PAIN PRESENT: ICD-10-PCS | Mod: S$GLB,,, | Performed by: DERMATOLOGY

## 2021-03-05 LAB — HM MAMMOGRAM: NORMAL

## 2021-04-15 ENCOUNTER — PATIENT MESSAGE (OUTPATIENT)
Dept: RESEARCH | Facility: HOSPITAL | Age: 66
End: 2021-04-15

## 2021-11-02 ENCOUNTER — OFFICE VISIT (OUTPATIENT)
Dept: INTERNAL MEDICINE | Facility: CLINIC | Age: 66
End: 2021-11-02
Payer: COMMERCIAL

## 2021-11-02 VITALS
OXYGEN SATURATION: 97 % | BODY MASS INDEX: 34.64 KG/M2 | TEMPERATURE: 98 F | DIASTOLIC BLOOD PRESSURE: 80 MMHG | SYSTOLIC BLOOD PRESSURE: 130 MMHG | HEIGHT: 62 IN | WEIGHT: 188.25 LBS | HEART RATE: 68 BPM

## 2021-11-02 DIAGNOSIS — Z13.1 SCREENING FOR DIABETES MELLITUS: ICD-10-CM

## 2021-11-02 DIAGNOSIS — R10.2 PELVIC PRESSURE IN FEMALE: ICD-10-CM

## 2021-11-02 DIAGNOSIS — Z13.6 ENCOUNTER FOR LIPID SCREENING FOR CARDIOVASCULAR DISEASE: ICD-10-CM

## 2021-11-02 DIAGNOSIS — Z13.21 ENCOUNTER FOR VITAMIN DEFICIENCY SCREENING: ICD-10-CM

## 2021-11-02 DIAGNOSIS — K21.9 GASTROESOPHAGEAL REFLUX DISEASE WITHOUT ESOPHAGITIS: ICD-10-CM

## 2021-11-02 DIAGNOSIS — Z98.84 BARIATRIC SURGERY STATUS: ICD-10-CM

## 2021-11-02 DIAGNOSIS — Z13.220 ENCOUNTER FOR LIPID SCREENING FOR CARDIOVASCULAR DISEASE: ICD-10-CM

## 2021-11-02 DIAGNOSIS — Z11.4 SCREENING FOR HIV (HUMAN IMMUNODEFICIENCY VIRUS): ICD-10-CM

## 2021-11-02 DIAGNOSIS — Z11.59 ENCOUNTER FOR HEPATITIS C SCREENING TEST FOR LOW RISK PATIENT: ICD-10-CM

## 2021-11-02 DIAGNOSIS — J06.9 URTI (ACUTE UPPER RESPIRATORY INFECTION): ICD-10-CM

## 2021-11-02 DIAGNOSIS — N30.00 ACUTE CYSTITIS WITHOUT HEMATURIA: ICD-10-CM

## 2021-11-02 DIAGNOSIS — Z00.00 ENCOUNTER FOR MEDICAL EXAMINATION TO ESTABLISH CARE: Primary | ICD-10-CM

## 2021-11-02 PROCEDURE — 87088 URINE BACTERIA CULTURE: CPT | Performed by: NURSE PRACTITIONER

## 2021-11-02 PROCEDURE — 3008F PR BODY MASS INDEX (BMI) DOCUMENTED: ICD-10-PCS | Mod: CPTII,S$GLB,, | Performed by: NURSE PRACTITIONER

## 2021-11-02 PROCEDURE — 3075F PR MOST RECENT SYSTOLIC BLOOD PRESS GE 130-139MM HG: ICD-10-PCS | Mod: CPTII,S$GLB,, | Performed by: NURSE PRACTITIONER

## 2021-11-02 PROCEDURE — 87086 URINE CULTURE/COLONY COUNT: CPT | Performed by: NURSE PRACTITIONER

## 2021-11-02 PROCEDURE — 99386 PR PREVENTIVE VISIT,NEW,40-64: ICD-10-PCS | Mod: S$GLB,,, | Performed by: NURSE PRACTITIONER

## 2021-11-02 PROCEDURE — 99999 PR PBB SHADOW E&M-EST. PATIENT-LVL IV: ICD-10-PCS | Mod: PBBFAC,,, | Performed by: NURSE PRACTITIONER

## 2021-11-02 PROCEDURE — 99999 PR PBB SHADOW E&M-EST. PATIENT-LVL IV: CPT | Mod: PBBFAC,,, | Performed by: NURSE PRACTITIONER

## 2021-11-02 PROCEDURE — 1160F PR REVIEW ALL MEDS BY PRESCRIBER/CLIN PHARMACIST DOCUMENTED: ICD-10-PCS | Mod: CPTII,S$GLB,, | Performed by: NURSE PRACTITIONER

## 2021-11-02 PROCEDURE — 1160F RVW MEDS BY RX/DR IN RCRD: CPT | Mod: CPTII,S$GLB,, | Performed by: NURSE PRACTITIONER

## 2021-11-02 PROCEDURE — 3079F DIAST BP 80-89 MM HG: CPT | Mod: CPTII,S$GLB,, | Performed by: NURSE PRACTITIONER

## 2021-11-02 PROCEDURE — 3008F BODY MASS INDEX DOCD: CPT | Mod: CPTII,S$GLB,, | Performed by: NURSE PRACTITIONER

## 2021-11-02 PROCEDURE — 99386 PREV VISIT NEW AGE 40-64: CPT | Mod: S$GLB,,, | Performed by: NURSE PRACTITIONER

## 2021-11-02 PROCEDURE — 3075F SYST BP GE 130 - 139MM HG: CPT | Mod: CPTII,S$GLB,, | Performed by: NURSE PRACTITIONER

## 2021-11-02 PROCEDURE — 87186 SC STD MICRODIL/AGAR DIL: CPT | Performed by: NURSE PRACTITIONER

## 2021-11-02 PROCEDURE — 1159F MED LIST DOCD IN RCRD: CPT | Mod: CPTII,S$GLB,, | Performed by: NURSE PRACTITIONER

## 2021-11-02 PROCEDURE — 1159F PR MEDICATION LIST DOCUMENTED IN MEDICAL RECORD: ICD-10-PCS | Mod: CPTII,S$GLB,, | Performed by: NURSE PRACTITIONER

## 2021-11-02 PROCEDURE — 3079F PR MOST RECENT DIASTOLIC BLOOD PRESSURE 80-89 MM HG: ICD-10-PCS | Mod: CPTII,S$GLB,, | Performed by: NURSE PRACTITIONER

## 2021-11-02 PROCEDURE — 81001 URINALYSIS AUTO W/SCOPE: CPT | Performed by: NURSE PRACTITIONER

## 2021-11-02 PROCEDURE — 87077 CULTURE AEROBIC IDENTIFY: CPT | Performed by: NURSE PRACTITIONER

## 2021-11-02 RX ORDER — SULFAMETHOXAZOLE AND TRIMETHOPRIM 800; 160 MG/1; MG/1
1 TABLET ORAL 2 TIMES DAILY
COMMUNITY
Start: 2021-08-18 | End: 2021-12-14

## 2021-11-02 RX ORDER — AMOXICILLIN AND CLAVULANATE POTASSIUM 875; 125 MG/1; MG/1
1 TABLET, FILM COATED ORAL EVERY 12 HOURS
Qty: 14 TABLET | Refills: 0 | Status: SHIPPED | OUTPATIENT
Start: 2021-11-02 | End: 2021-11-09

## 2021-11-02 RX ORDER — METRONIDAZOLE 7.5 MG/G
GEL TOPICAL DAILY
COMMUNITY
Start: 2021-08-09 | End: 2021-12-14

## 2021-11-03 LAB
BACTERIA #/AREA URNS AUTO: ABNORMAL /HPF
BILIRUB UR QL STRIP: NEGATIVE
CLARITY UR REFRACT.AUTO: CLEAR
COLOR UR AUTO: ABNORMAL
GLUCOSE UR QL STRIP: NEGATIVE
HGB UR QL STRIP: NEGATIVE
KETONES UR QL STRIP: NEGATIVE
LEUKOCYTE ESTERASE UR QL STRIP: ABNORMAL
MICROSCOPIC COMMENT: ABNORMAL
NITRITE UR QL STRIP: NEGATIVE
PH UR STRIP: 6 [PH] (ref 5–8)
PROT UR QL STRIP: NEGATIVE
SP GR UR STRIP: 1 (ref 1–1.03)
SQUAMOUS #/AREA URNS AUTO: 1 /HPF
URN SPEC COLLECT METH UR: ABNORMAL
WBC #/AREA URNS AUTO: 2 /HPF (ref 0–5)

## 2021-11-04 LAB — BACTERIA UR CULT: ABNORMAL

## 2021-11-21 ENCOUNTER — PATIENT MESSAGE (OUTPATIENT)
Dept: INTERNAL MEDICINE | Facility: CLINIC | Age: 66
End: 2021-11-21
Payer: COMMERCIAL

## 2021-11-23 RX ORDER — LORAZEPAM 1 MG/1
TABLET ORAL
Refills: 0 | Status: CANCELLED | OUTPATIENT
Start: 2021-11-23

## 2021-11-24 ENCOUNTER — PATIENT MESSAGE (OUTPATIENT)
Dept: INTERNAL MEDICINE | Facility: CLINIC | Age: 66
End: 2021-11-24
Payer: COMMERCIAL

## 2021-11-24 RX ORDER — RALOXIFENE HYDROCHLORIDE 60 MG/1
60 TABLET, FILM COATED ORAL DAILY
Refills: 1 | Status: CANCELLED | OUTPATIENT
Start: 2021-11-24

## 2021-11-24 RX ORDER — LEVOTHYROXINE SODIUM 100 UG/1
100 TABLET ORAL DAILY
Qty: 30 TABLET | Refills: 0 | Status: SHIPPED | OUTPATIENT
Start: 2021-11-24 | End: 2021-12-14 | Stop reason: SDUPTHER

## 2021-11-24 RX ORDER — PRAMIPEXOLE DIHYDROCHLORIDE 0.12 MG/1
0.12 TABLET ORAL NIGHTLY
Refills: 1 | Status: CANCELLED | OUTPATIENT
Start: 2021-11-24

## 2021-11-24 RX ORDER — LOSARTAN POTASSIUM AND HYDROCHLOROTHIAZIDE 12.5; 1 MG/1; MG/1
1 TABLET ORAL DAILY
Qty: 30 TABLET | Refills: 0 | Status: SHIPPED | OUTPATIENT
Start: 2021-11-24 | End: 2021-12-14 | Stop reason: SDUPTHER

## 2021-11-30 ENCOUNTER — PATIENT OUTREACH (OUTPATIENT)
Dept: ADMINISTRATIVE | Facility: HOSPITAL | Age: 66
End: 2021-11-30
Payer: COMMERCIAL

## 2021-12-14 ENCOUNTER — OFFICE VISIT (OUTPATIENT)
Dept: PRIMARY CARE CLINIC | Facility: CLINIC | Age: 66
End: 2021-12-14
Payer: COMMERCIAL

## 2021-12-14 DIAGNOSIS — N39.0 RECURRENT UTI: ICD-10-CM

## 2021-12-14 DIAGNOSIS — E03.9 HYPOTHYROIDISM, UNSPECIFIED TYPE: ICD-10-CM

## 2021-12-14 DIAGNOSIS — M81.0 OSTEOPOROSIS, UNSPECIFIED OSTEOPOROSIS TYPE, UNSPECIFIED PATHOLOGICAL FRACTURE PRESENCE: ICD-10-CM

## 2021-12-14 DIAGNOSIS — G25.81 RLS (RESTLESS LEGS SYNDROME): Primary | ICD-10-CM

## 2021-12-14 DIAGNOSIS — I10 ESSENTIAL HYPERTENSION: ICD-10-CM

## 2021-12-14 DIAGNOSIS — G47.00 INSOMNIA, UNSPECIFIED TYPE: ICD-10-CM

## 2021-12-14 PROCEDURE — 99214 OFFICE O/P EST MOD 30 MIN: CPT | Mod: 95,,, | Performed by: FAMILY MEDICINE

## 2021-12-14 PROCEDURE — 99214 PR OFFICE/OUTPT VISIT, EST, LEVL IV, 30-39 MIN: ICD-10-PCS | Mod: 95,,, | Performed by: FAMILY MEDICINE

## 2021-12-14 RX ORDER — LOSARTAN POTASSIUM AND HYDROCHLOROTHIAZIDE 12.5; 1 MG/1; MG/1
1 TABLET ORAL DAILY
Qty: 90 TABLET | Refills: 3 | Status: SHIPPED | OUTPATIENT
Start: 2021-12-14

## 2021-12-14 RX ORDER — LEVOTHYROXINE SODIUM 100 UG/1
100 TABLET ORAL DAILY
Qty: 90 TABLET | Refills: 3 | Status: SHIPPED | OUTPATIENT
Start: 2021-12-14

## 2021-12-14 RX ORDER — PRAMIPEXOLE DIHYDROCHLORIDE 0.12 MG/1
0.12 TABLET ORAL NIGHTLY
Qty: 90 TABLET | Refills: 3 | Status: SHIPPED | OUTPATIENT
Start: 2021-12-14 | End: 2023-01-05

## 2021-12-14 RX ORDER — HYDROXYZINE HYDROCHLORIDE 25 MG/1
25 TABLET, FILM COATED ORAL NIGHTLY PRN
Qty: 50 TABLET | Refills: 3 | Status: SHIPPED | OUTPATIENT
Start: 2021-12-14 | End: 2022-07-05

## 2021-12-14 RX ORDER — RALOXIFENE HYDROCHLORIDE 60 MG/1
60 TABLET, FILM COATED ORAL DAILY
Qty: 90 TABLET | Refills: 3 | Status: SHIPPED | OUTPATIENT
Start: 2021-12-14

## 2021-12-14 RX ORDER — ESTRADIOL 0.1 MG/G
CREAM VAGINAL
Qty: 42.5 G | Refills: 3 | Status: SHIPPED | OUTPATIENT
Start: 2021-12-14

## 2022-07-13 DIAGNOSIS — Z11.59 NEED FOR HEPATITIS C SCREENING TEST: ICD-10-CM

## 2022-07-13 DIAGNOSIS — Z12.31 OTHER SCREENING MAMMOGRAM: ICD-10-CM

## 2022-07-19 ENCOUNTER — PATIENT MESSAGE (OUTPATIENT)
Dept: RESEARCH | Facility: CLINIC | Age: 67
End: 2022-07-19
Payer: COMMERCIAL

## 2022-10-13 DIAGNOSIS — I10 ESSENTIAL HYPERTENSION: ICD-10-CM

## 2022-10-13 DIAGNOSIS — Z78.0 ASYMPTOMATIC MENOPAUSAL STATE: ICD-10-CM

## 2022-12-21 ENCOUNTER — PATIENT OUTREACH (OUTPATIENT)
Dept: ADMINISTRATIVE | Facility: HOSPITAL | Age: 67
End: 2022-12-21
Payer: COMMERCIAL

## 2023-04-12 ENCOUNTER — PATIENT MESSAGE (OUTPATIENT)
Dept: INFECTIOUS DISEASES | Facility: CLINIC | Age: 68
End: 2023-04-12
Payer: COMMERCIAL

## 2024-11-19 ENCOUNTER — TELEPHONE (OUTPATIENT)
Dept: OPTOMETRY | Facility: CLINIC | Age: 69
End: 2024-11-19
Payer: MEDICARE

## 2024-11-20 ENCOUNTER — OFFICE VISIT (OUTPATIENT)
Dept: OPTOMETRY | Facility: CLINIC | Age: 69
End: 2024-11-20
Payer: COMMERCIAL

## 2024-11-20 DIAGNOSIS — H25.13 NUCLEAR SCLEROSIS, BILATERAL: ICD-10-CM

## 2024-11-20 DIAGNOSIS — Z01.00 ROUTINE EYE EXAM: Primary | ICD-10-CM

## 2024-11-20 DIAGNOSIS — Z13.5 GLAUCOMA SCREENING: ICD-10-CM

## 2024-11-20 DIAGNOSIS — H52.4 PRESBYOPIA: ICD-10-CM

## 2024-11-20 PROCEDURE — 92015 DETERMINE REFRACTIVE STATE: CPT | Mod: S$GLB,,, | Performed by: OPTOMETRIST

## 2024-11-20 PROCEDURE — 99999 PR PBB SHADOW E&M-EST. PATIENT-LVL II: CPT | Mod: PBBFAC,,, | Performed by: OPTOMETRIST

## 2024-11-20 PROCEDURE — 92004 COMPRE OPH EXAM NEW PT 1/>: CPT | Mod: S$GLB,,, | Performed by: OPTOMETRIST

## 2024-11-20 NOTE — PROGRESS NOTES
HPI     new patient to eye clinic             Comments: Dfe   Annual eye exam   Glasses  are 2 yrs  old   Street signs are harder to focus and driving has become more difficult   Smaller print is also harder to focus on while driving     Gtts    none    Blurry va ou          Last edited by Gricelda Healy on 11/20/2024  2:18 PM.            Assessment /Plan     For exam results, see Encounter Report.    Routine eye exam    Nuclear sclerosis, bilateral    Glaucoma screening    Presbyopia      Cat OU--pt reports inc problems at night.  Also does needlework and gary, so wishes cat surgery    PLAN:    Surgical consult--Dr Rm

## 2024-12-03 ENCOUNTER — TELEPHONE (OUTPATIENT)
Facility: CLINIC | Age: 69
End: 2024-12-03
Payer: MEDICARE

## 2024-12-03 NOTE — TELEPHONE ENCOUNTER
----- Message from Bobby Carlin sent at 12/3/2024 10:22 AM CST -----  Attempted to call pt, pt did not answer to make an appt  ----- Message -----  From: Jossie Leblanc  Sent: 12/2/2024  12:48 PM CST  To: [facility] Staff    Who called:self    What is the request in detail:pt would like to est care not able to schedule it    Can the clinic reply by [facility]?no    Would the patient rather a call back or a response via [facility]?call    Best call back number:.[phone]    Additional Information:

## 2024-12-04 ENCOUNTER — OFFICE VISIT (OUTPATIENT)
Dept: OPHTHALMOLOGY | Facility: CLINIC | Age: 69
End: 2024-12-04
Payer: MEDICARE

## 2024-12-04 DIAGNOSIS — H25.13 NUCLEAR SCLEROSIS, BILATERAL: Primary | ICD-10-CM

## 2024-12-04 PROCEDURE — 92004 COMPRE OPH EXAM NEW PT 1/>: CPT | Mod: S$GLB,,, | Performed by: OPHTHALMOLOGY

## 2024-12-04 PROCEDURE — 3288F FALL RISK ASSESSMENT DOCD: CPT | Mod: CPTII,S$GLB,, | Performed by: OPHTHALMOLOGY

## 2024-12-04 PROCEDURE — 1101F PT FALLS ASSESS-DOCD LE1/YR: CPT | Mod: CPTII,S$GLB,, | Performed by: OPHTHALMOLOGY

## 2024-12-04 PROCEDURE — 1160F RVW MEDS BY RX/DR IN RCRD: CPT | Mod: CPTII,S$GLB,, | Performed by: OPHTHALMOLOGY

## 2024-12-04 PROCEDURE — 1159F MED LIST DOCD IN RCRD: CPT | Mod: CPTII,S$GLB,, | Performed by: OPHTHALMOLOGY

## 2024-12-04 PROCEDURE — 99999 PR PBB SHADOW E&M-EST. PATIENT-LVL III: CPT | Mod: PBBFAC,,, | Performed by: OPHTHALMOLOGY

## 2024-12-04 NOTE — PROGRESS NOTES
HPI     new patient to cornea  clinic   dfe             Comments: New patient to cornea  clinic   Dfe  Possible  discussion about catarcat evaluation ou   Ref  by DR Austin     Pt c/o harder to focus   Driving has become more difficult   Small print is hard to read  boosting up font to read    Needlework has become  harder to  sew lately     Gtts  none     Dls  11/20/24         Last edited by Gricelda Healy on 12/4/2024  2:45 PM.            Assessment /Plan     For exam results, see Encounter Report.    Nuclear sclerosis, bilateral      Incipient cataract: Patient does reports mild visual decline, but not sufficient to affect activities of daily living. I recommend monitoring visual status and follow up when visual symptoms worsen.  20/20 [facility]

## 2024-12-19 ENCOUNTER — LAB VISIT (OUTPATIENT)
Dept: LAB | Facility: HOSPITAL | Age: 69
End: 2024-12-19
Attending: STUDENT IN AN ORGANIZED HEALTH CARE EDUCATION/TRAINING PROGRAM
Payer: MEDICARE

## 2024-12-19 ENCOUNTER — OFFICE VISIT (OUTPATIENT)
Facility: CLINIC | Age: 69
End: 2024-12-19
Payer: MEDICARE

## 2024-12-19 VITALS
HEART RATE: 62 BPM | SYSTOLIC BLOOD PRESSURE: 128 MMHG | BODY MASS INDEX: 23.82 KG/M2 | RESPIRATION RATE: 18 BRPM | WEIGHT: 129.44 LBS | OXYGEN SATURATION: 97 % | TEMPERATURE: 98 F | DIASTOLIC BLOOD PRESSURE: 68 MMHG | HEIGHT: 62 IN

## 2024-12-19 DIAGNOSIS — M81.0 OSTEOPOROSIS, UNSPECIFIED OSTEOPOROSIS TYPE, UNSPECIFIED PATHOLOGICAL FRACTURE PRESENCE: ICD-10-CM

## 2024-12-19 DIAGNOSIS — E06.3 HYPOTHYROIDISM DUE TO HASHIMOTO THYROIDITIS: ICD-10-CM

## 2024-12-19 DIAGNOSIS — Z11.3 ROUTINE SCREENING FOR STI (SEXUALLY TRANSMITTED INFECTION): ICD-10-CM

## 2024-12-19 DIAGNOSIS — G25.81 RLS (RESTLESS LEGS SYNDROME): ICD-10-CM

## 2024-12-19 DIAGNOSIS — K21.9 GASTROESOPHAGEAL REFLUX DISEASE, UNSPECIFIED WHETHER ESOPHAGITIS PRESENT: ICD-10-CM

## 2024-12-19 DIAGNOSIS — R79.9 ABNORMAL FINDING OF BLOOD CHEMISTRY, UNSPECIFIED: ICD-10-CM

## 2024-12-19 DIAGNOSIS — Z12.31 ENCOUNTER FOR SCREENING MAMMOGRAM FOR MALIGNANT NEOPLASM OF BREAST: ICD-10-CM

## 2024-12-19 DIAGNOSIS — Z23 NEED FOR INFLUENZA VACCINATION: ICD-10-CM

## 2024-12-19 DIAGNOSIS — Z23 NEED FOR SHINGLES VACCINE: ICD-10-CM

## 2024-12-19 DIAGNOSIS — Z12.11 SCREENING FOR MALIGNANT NEOPLASM OF COLON: ICD-10-CM

## 2024-12-19 DIAGNOSIS — Z00.00 ANNUAL PHYSICAL EXAM: ICD-10-CM

## 2024-12-19 DIAGNOSIS — Z00.00 ANNUAL PHYSICAL EXAM: Primary | ICD-10-CM

## 2024-12-19 LAB
ALBUMIN SERPL BCP-MCNC: 4.1 G/DL (ref 3.5–5.2)
ALP SERPL-CCNC: 112 U/L (ref 40–150)
ALT SERPL W/O P-5'-P-CCNC: 20 U/L (ref 10–44)
ANION GAP SERPL CALC-SCNC: 10 MMOL/L (ref 8–16)
AST SERPL-CCNC: 28 U/L (ref 10–40)
BASOPHILS # BLD AUTO: 0.08 K/UL (ref 0–0.2)
BASOPHILS NFR BLD: 1.3 % (ref 0–1.9)
BILIRUB SERPL-MCNC: 0.6 MG/DL (ref 0.1–1)
BUN SERPL-MCNC: 22 MG/DL (ref 8–23)
CALCIUM SERPL-MCNC: 9.4 MG/DL (ref 8.7–10.5)
CHLORIDE SERPL-SCNC: 102 MMOL/L (ref 95–110)
CHOLEST SERPL-MCNC: 191 MG/DL (ref 120–199)
CHOLEST/HDLC SERPL: 2 {RATIO} (ref 2–5)
CO2 SERPL-SCNC: 26 MMOL/L (ref 23–29)
CREAT SERPL-MCNC: 0.8 MG/DL (ref 0.5–1.4)
DIFFERENTIAL METHOD BLD: ABNORMAL
EOSINOPHIL # BLD AUTO: 0.1 K/UL (ref 0–0.5)
EOSINOPHIL NFR BLD: 1 % (ref 0–8)
ERYTHROCYTE [DISTWIDTH] IN BLOOD BY AUTOMATED COUNT: 16.2 % (ref 11.5–14.5)
EST. GFR  (NO RACE VARIABLE): >60 ML/MIN/1.73 M^2
ESTIMATED AVG GLUCOSE: 103 MG/DL (ref 68–131)
GLUCOSE SERPL-MCNC: 77 MG/DL (ref 70–110)
HBA1C MFR BLD: 5.2 % (ref 4–5.6)
HBV SURFACE AG SERPL QL IA: NORMAL
HCT VFR BLD AUTO: 40.3 % (ref 37–48.5)
HCV AB SERPL QL IA: REACTIVE
HDLC SERPL-MCNC: 94 MG/DL (ref 40–75)
HDLC SERPL: 49.2 % (ref 20–50)
HGB BLD-MCNC: 12 G/DL (ref 12–16)
HIV 1+2 AB+HIV1 P24 AG SERPL QL IA: NORMAL
IMM GRANULOCYTES # BLD AUTO: 0.02 K/UL (ref 0–0.04)
IMM GRANULOCYTES NFR BLD AUTO: 0.3 % (ref 0–0.5)
IRON SERPL-MCNC: 26 UG/DL (ref 30–160)
LDLC SERPL CALC-MCNC: 88.4 MG/DL (ref 63–159)
LYMPHOCYTES # BLD AUTO: 1.4 K/UL (ref 1–4.8)
LYMPHOCYTES NFR BLD: 22.2 % (ref 18–48)
MCH RBC QN AUTO: 24.6 PG (ref 27–31)
MCHC RBC AUTO-ENTMCNC: 29.8 G/DL (ref 32–36)
MCV RBC AUTO: 83 FL (ref 82–98)
MONOCYTES # BLD AUTO: 0.7 K/UL (ref 0.3–1)
MONOCYTES NFR BLD: 12 % (ref 4–15)
NEUTROPHILS # BLD AUTO: 3.9 K/UL (ref 1.8–7.7)
NEUTROPHILS NFR BLD: 63.2 % (ref 38–73)
NONHDLC SERPL-MCNC: 97 MG/DL
NRBC BLD-RTO: 0 /100 WBC
PLATELET # BLD AUTO: 354 K/UL (ref 150–450)
PMV BLD AUTO: 11.2 FL (ref 9.2–12.9)
POTASSIUM SERPL-SCNC: 3.9 MMOL/L (ref 3.5–5.1)
PROT SERPL-MCNC: 7.5 G/DL (ref 6–8.4)
RBC # BLD AUTO: 4.87 M/UL (ref 4–5.4)
SATURATED IRON: 5 % (ref 20–50)
SODIUM SERPL-SCNC: 138 MMOL/L (ref 136–145)
T4 FREE SERPL-MCNC: 1.27 NG/DL (ref 0.71–1.51)
TOTAL IRON BINDING CAPACITY: 519 UG/DL (ref 250–450)
TRANSFERRIN SERPL-MCNC: 351 MG/DL (ref 200–375)
TREPONEMA PALLIDUM IGG+IGM AB [PRESENCE] IN SERUM OR PLASMA BY IMMUNOASSAY: NONREACTIVE
TRIGL SERPL-MCNC: 43 MG/DL (ref 30–150)
TSH SERPL DL<=0.005 MIU/L-ACNC: 0.51 UIU/ML (ref 0.4–4)
WBC # BLD AUTO: 6.09 K/UL (ref 3.9–12.7)

## 2024-12-19 PROCEDURE — 36415 COLL VENOUS BLD VENIPUNCTURE: CPT | Performed by: STUDENT IN AN ORGANIZED HEALTH CARE EDUCATION/TRAINING PROGRAM

## 2024-12-19 PROCEDURE — 87389 HIV-1 AG W/HIV-1&-2 AB AG IA: CPT | Performed by: STUDENT IN AN ORGANIZED HEALTH CARE EDUCATION/TRAINING PROGRAM

## 2024-12-19 PROCEDURE — 83036 HEMOGLOBIN GLYCOSYLATED A1C: CPT | Performed by: STUDENT IN AN ORGANIZED HEALTH CARE EDUCATION/TRAINING PROGRAM

## 2024-12-19 PROCEDURE — 85025 COMPLETE CBC W/AUTO DIFF WBC: CPT | Performed by: STUDENT IN AN ORGANIZED HEALTH CARE EDUCATION/TRAINING PROGRAM

## 2024-12-19 PROCEDURE — 86593 SYPHILIS TEST NON-TREP QUANT: CPT | Performed by: STUDENT IN AN ORGANIZED HEALTH CARE EDUCATION/TRAINING PROGRAM

## 2024-12-19 PROCEDURE — 84443 ASSAY THYROID STIM HORMONE: CPT | Performed by: STUDENT IN AN ORGANIZED HEALTH CARE EDUCATION/TRAINING PROGRAM

## 2024-12-19 PROCEDURE — 80053 COMPREHEN METABOLIC PANEL: CPT | Performed by: STUDENT IN AN ORGANIZED HEALTH CARE EDUCATION/TRAINING PROGRAM

## 2024-12-19 PROCEDURE — 83540 ASSAY OF IRON: CPT | Performed by: STUDENT IN AN ORGANIZED HEALTH CARE EDUCATION/TRAINING PROGRAM

## 2024-12-19 PROCEDURE — 84439 ASSAY OF FREE THYROXINE: CPT | Performed by: STUDENT IN AN ORGANIZED HEALTH CARE EDUCATION/TRAINING PROGRAM

## 2024-12-19 PROCEDURE — 87340 HEPATITIS B SURFACE AG IA: CPT | Performed by: STUDENT IN AN ORGANIZED HEALTH CARE EDUCATION/TRAINING PROGRAM

## 2024-12-19 PROCEDURE — 99999 PR PBB SHADOW E&M-EST. PATIENT-LVL V: CPT | Mod: PBBFAC,,, | Performed by: STUDENT IN AN ORGANIZED HEALTH CARE EDUCATION/TRAINING PROGRAM

## 2024-12-19 PROCEDURE — 80061 LIPID PANEL: CPT | Performed by: STUDENT IN AN ORGANIZED HEALTH CARE EDUCATION/TRAINING PROGRAM

## 2024-12-19 PROCEDURE — 86803 HEPATITIS C AB TEST: CPT | Performed by: STUDENT IN AN ORGANIZED HEALTH CARE EDUCATION/TRAINING PROGRAM

## 2024-12-19 PROCEDURE — 82652 VIT D 1 25-DIHYDROXY: CPT | Performed by: STUDENT IN AN ORGANIZED HEALTH CARE EDUCATION/TRAINING PROGRAM

## 2024-12-19 RX ORDER — PHENAZOPYRIDINE HYDROCHLORIDE 200 MG/1
200 TABLET, FILM COATED ORAL 3 TIMES DAILY
COMMUNITY
Start: 2024-08-29

## 2024-12-19 NOTE — PROGRESS NOTES
SUBJECTIVE     Chief Complaint   Patient presents with    [facility]       History of Present Illness            Pt reports she has severe restless leg syndrome. Also has nighttime leg cramps. Takes a THC gummy at night that also helps.    Has a history of osteoporosis, was taking Evista but then got to osteopenia and stopped. Now just vitamin d and calcium.    Also has hx of gastric bypass surgery and hiatal hernia. Now no symptoms.    Takes hyzaar for HTN.     Takes synthroid for hypothyroidism.    Walks for exercise.    Review of Systems   HENT:  Negative for hearing loss.    Eyes:  Negative for discharge.   Respiratory:  Negative for wheezing.    Cardiovascular:  Negative for chest pain and palpitations.   Gastrointestinal:  Negative for blood in stool, constipation, diarrhea and vomiting.   Genitourinary:  Negative for dysuria and hematuria.   Musculoskeletal:  Negative for neck pain.   Neurological:  Positive for weakness. Negative for headaches.   Endo/Heme/Allergies:  Negative for polydipsia.       PAST MEDICAL HISTORY:  Past Medical History:   Diagnosis Date    Ac marginal ulcer w perf     Hypertension     Jejunal ulcer     Thyroid disease        ALLERGIES AND MEDICATIONS: updated and reviewed.  Review of patient's allergies indicates:  No Known Allergies  Current Outpatient Medications   Medication Sig Dispense Refill    cetirizine 0.24 % Dpet       levothyroxine (SYNTHROID) 100 MCG tablet Take 1 tablet (100 mcg total) by mouth once daily. 90 tablet 3    losartan-hydrochlorothiazide 100-12.5 mg (HYZAAR) 100-12.5 mg Tab Take 1 tablet by mouth once daily. 90 tablet 3    phenazopyridine (PYRIDIUM) 200 MG tablet Take 200 mg by mouth 3 (three) times daily.      pramipexole (MIRAPEX) 0.125 MG tablet TAKE 1 TABLET (0.125 MG TOTAL) BY MOUTH EVERY EVENING. 90 tablet 3     Current Facility-Administered Medications   Medication Dose Route Frequency Provider Last Rate Last Admin    influenza (adjuvanted) (Fluad)  "45 mcg/0.5 mL IM vaccine (> or = 64 yo) 0.5 mL  0.5 mL Intramuscular 1 time in Clinic/HOD         varicella zoster (Shingrix) IM vaccine (>/= 49 yo)  0.5 mL Intramuscular Once              OBJECTIVE     Physical Exam  Vitals:    12/19/24 0835   BP: 128/68   Pulse: 62   Resp: 18   Temp: 97.6 °F (36.4 °C)    Body mass index is 23.67 kg/m².  Weight: 58.7 kg (129 lb 6.6 oz)   Height: 5' 2" (157.5 cm)     Physical Exam  Vitals reviewed.   Constitutional:       General: She is not in acute distress.  HENT:      Right Ear: External ear normal.      Left Ear: External ear normal.      Nose: Nose normal.      Mouth/Throat:      Mouth: Mucous membranes are moist.   Eyes:      Extraocular Movements: Extraocular movements intact.      Conjunctiva/sclera: Conjunctivae normal.      Pupils: Pupils are equal, round, and reactive to light.   Cardiovascular:      Rate and Rhythm: Normal rate and regular rhythm.      Heart sounds: No murmur heard.  Pulmonary:      Effort: Pulmonary effort is normal.   Abdominal:      General: There is no distension.      Palpations: Abdomen is soft.   Musculoskeletal:         General: No swelling. Normal range of motion.      Cervical back: Normal range of motion.   Skin:     General: Skin is warm and dry.      Findings: No rash.   Neurological:      General: No focal deficit present.      Mental Status: She is alert and oriented to person, place, and time.   Psychiatric:         Mood and Affect: Mood normal.         Behavior: Behavior normal.           Health Maintenance         Date Due Completion Date    Hepatitis C Screening Never done ---    Shingles Vaccine (1 of 2) Never done ---    Colorectal Cancer Screening 05/11/2023 5/11/2018    Influenza Vaccine (1) 09/01/2024 12/29/2022    COVID-19 Vaccine (5 - 2024-25 season) 09/01/2024 4/5/2021    Mammogram 12/29/2024 12/29/2023    DEXA Scan 12/27/2025 12/27/2022    Lipid Panel 05/03/2029 5/3/2024    TETANUS VACCINE 09/14/2032 9/14/2022    RSV Vaccine " (Age 60+ and Pregnant patients) (1 - 1-dose 75+ series) 11/29/2032 ---              ASSESSMENT     69 y.o. female with     1. Annual physical exam    2. Osteoporosis, unspecified osteoporosis type, unspecified pathological fracture presence    3. RLS (restless legs syndrome)    4. Gastroesophageal reflux disease, unspecified whether esophagitis present    5. Screening for malignant neoplasm of colon    6. Encounter for screening mammogram for malignant neoplasm of breast    7. Need for influenza vaccination    8. Need for shingles vaccine    9. Routine screening for STI (sexually transmitted infection)    10. Hypothyroidism due to Hashimoto thyroiditis    11. Abnormal finding of blood chemistry, unspecified        PLAN:     1. Annual physical exam  - Discussed age and gender appropriate screenings at this visit and encouraged a healthy diet low in simple carbohydrates, and increased physical activity.  Counseled on medically appropriate vaccines based on age and current health condition.  Screening test reviewed and discussed with patient.   - Hemoglobin A1C; Future  - Lipid Panel; Future  - TSH; Future  - Comprehensive Metabolic Panel; Future  - CBC Auto Differential; Future    2. Osteoporosis, unspecified osteoporosis type, unspecified pathological fracture presence  - due for recheck, ordered. Start medical therapy pending results.  - Hemoglobin A1C; Future  - Lipid Panel; Future  - TSH; Future  - Comprehensive Metabolic Panel; Future  - CBC Auto Differential; Future  - Calcitriol; Future  - DXA Bone Density Axial Skeleton 1 or more sites; Future    3. RLS (restless legs syndrome)  - uncontrolled. Will check iron and vit d level. Continue pramipexole. F/u 6 months.  - Hemoglobin A1C; Future  - Lipid Panel; Future  - TSH; Future  - Comprehensive Metabolic Panel; Future  - CBC Auto Differential; Future  - Iron and TIBC; Future  - Calcitriol; Future    4. Gastroesophageal reflux disease, unspecified whether  esophagitis present  - stable. Continue PPI PRN. F/u 6 months.  - Hemoglobin A1C; Future  - Lipid Panel; Future  - TSH; Future  - Comprehensive Metabolic Panel; Future  - CBC Auto Differential; Future    5. Screening for malignant neoplasm of colon  - due for colonoscopy,. Ordered.    6. Encounter for screening mammogram for malignant neoplasm of breast  - Due, ordered.  - Mammo Digital Screening Bilat w/ Slick; Future    7. Need for influenza vaccination  - given in clinic today  - influenza (adjuvanted) (Fluad) 45 mcg/0.5 mL IM vaccine (> or = 66 yo) 0.5 mL    8. Need for shingles vaccine  - given in clinic today  - varicella zoster (Shingrix) IM vaccine (>/= 51 yo)    9. Routine screening for STI (sexually transmitted infection)  - Due, ordered.  - Hepatitis B Surface Antigen; Future  - Hepatitis C Antibody; Future  - HIV 1/2 Ag/Ab (4th Gen); Future  - Treponema Pallidium Antibodies IgG, IgM; Future    10. Hypothyroidism due to Hashimoto thyroiditis  - stable, cont synthroid 100mg daily. F/u 6 months.  - Hemoglobin A1C; Future  - Lipid Panel; Future  - TSH; Future  - Comprehensive Metabolic Panel; Future  - CBC Auto Differential; Future  - Calcitriol; Future  - T4, Free; Future    11. Abnormal finding of blood chemistry, unspecified  - previously noted, will recheck today.   - Hemoglobin A1C; Future  - Iron and TIBC; Future  - Calcitriol; Future      Yanet Griffin MD  12/19/2024 9:02 AM        F/u 6 months.    This note was generated with the assistance of ambient listening technology. Verbal consent was obtained by the patient and accompanying visitor(s) for the recording of patient appointment to facilitate this note. I attest to having reviewed and edited the generated note for accuracy, though some syntax or spelling errors may persist. Please contact the author of this note for any clarification.

## 2024-12-20 ENCOUNTER — TELEPHONE (OUTPATIENT)
Dept: ENDOSCOPY | Facility: HOSPITAL | Age: 69
End: 2024-12-20
Payer: MEDICARE

## 2024-12-20 NOTE — TELEPHONE ENCOUNTER
Ambulatory referral/consult to Endo Procedure  **ONLY ORDER FOR [facility]** [[id number]]    Electronically signed by: Yanet Griffin MD on 12/19/24 1028 Status: Active   Ordering user: Yanet Griffin MD 12/19/24 1028 Authorized by: Yanet Griffin MD   Ordering mode: Standard   Frequency:  12/19/24 -     Diagnoses  Screening for malignant neoplasm of colon [Z12.11]   Questionnaire    Question Answer   What procedure is to be performed? Screening Colonoscopy   CPT Code: COLORECTAL SCRN; HI RISK IND   In which geographic region does the patient intend to have this procedure performed? [place], [facility], or [place] ONLY)     Order Diagnosis and CPT Display    Order Diagnosis: Screening for malignant neoplasm of colon [Z12.11 (ICD-10-CM)] CPT:

## 2024-12-23 DIAGNOSIS — R76.8 POSITIVE HEPATITIS C ANTIBODY TEST: Primary | ICD-10-CM

## 2024-12-23 LAB — 1,25(OH)2D3 SERPL-MCNC: 58 PG/ML (ref 20–79)

## 2024-12-23 NOTE — PROGRESS NOTES
Please call pt and let her know her hepatitis C screening test was positive. We have ordered confirmatory diagnostic testing for her to have completed at her convenience. Thanks!

## 2024-12-24 ENCOUNTER — LAB VISIT (OUTPATIENT)
Dept: LAB | Facility: HOSPITAL | Age: 69
End: 2024-12-24
Payer: MEDICARE

## 2024-12-24 DIAGNOSIS — R76.8 POSITIVE HEPATITIS C ANTIBODY TEST: ICD-10-CM

## 2024-12-24 PROCEDURE — 87522 HEPATITIS C REVRS TRNSCRPJ: CPT | Performed by: STUDENT IN AN ORGANIZED HEALTH CARE EDUCATION/TRAINING PROGRAM

## 2024-12-24 PROCEDURE — 36415 COLL VENOUS BLD VENIPUNCTURE: CPT | Performed by: STUDENT IN AN ORGANIZED HEALTH CARE EDUCATION/TRAINING PROGRAM

## 2024-12-26 ENCOUNTER — TELEPHONE (OUTPATIENT)
Dept: ENDOSCOPY | Facility: HOSPITAL | Age: 69
End: 2024-12-26
Payer: MEDICARE

## 2024-12-26 DIAGNOSIS — Z12.11 SPECIAL SCREENING FOR MALIGNANT NEOPLASMS, COLON: Primary | ICD-10-CM

## 2024-12-26 LAB
HCV RNA SERPL QL NAA+PROBE: NOT DETECTED
HCV RNA SPEC NAA+PROBE-ACNC: NOT DETECTED IU/ML

## 2024-12-26 RX ORDER — SODIUM, POTASSIUM,MAG SULFATES 17.5-3.13G
1 SOLUTION, RECONSTITUTED, ORAL ORAL DAILY
Qty: 1 KIT | Refills: 0 | Status: SHIPPED | OUTPATIENT
Start: 2024-12-26 | End: 2024-12-28

## 2024-12-26 NOTE — TELEPHONE ENCOUNTER
----- Message -----   From: Chely Kim   Sent: 12/19/2024   1:54 PM CST   To: [facility] Endoscopy Schedulers   Subject: Appointment Access                               Please call pt. @[phone] to schedule an appt.     Referral in chart.     RENETTA Kim

## 2024-12-26 NOTE — TELEPHONE ENCOUNTER
Referral for procedure from [facility]      Spoke to patient to schedule procedure(s) Colonoscopy       Physician to perform procedure(s) Dr. BOBO Owens  Date of Procedure (s) 02/27/25  Arrival Time 09:45 AM  Time of Procedure(s) 10:45 AM   Location of Procedure(s) [address]   Type of Rx Prep sent to patient: Suprep  Instructions provided to patient via [facility]    Patient was informed on the following information and verbalized understanding. Screening questionnaire reviewed with patient and complete. If procedure requires anesthesia, a responsible adult needs to be present to accompany the patient home, patient cannot drive after receiving anesthesia. Appointment details are tentative, especially check-in time. Patient will receive a prep-op call 7 days prior to confirm check-in time for procedure. If applicable the patient should contact their pharmacy to verify Rx for procedure prep is ready for pick-up. Patient was advised to call the scheduling department at [phone] if pharmacy states no Rx is available. Patient was advised to call the endoscopy scheduling department if any questions or concerns arise.       Endoscopy Scheduling Department

## 2024-12-30 ENCOUNTER — HOSPITAL ENCOUNTER (OUTPATIENT)
Dept: RADIOLOGY | Facility: CLINIC | Age: 69
Discharge: HOME | End: 2024-12-30
Attending: STUDENT IN AN ORGANIZED HEALTH CARE EDUCATION/TRAINING PROGRAM
Payer: MEDICARE

## 2024-12-30 DIAGNOSIS — M81.0 OSTEOPOROSIS, UNSPECIFIED OSTEOPOROSIS TYPE, UNSPECIFIED PATHOLOGICAL FRACTURE PRESENCE: ICD-10-CM

## 2024-12-30 PROCEDURE — 77080 DXA BONE DENSITY AXIAL: CPT | Mod: 26,,, | Performed by: INTERNAL MEDICINE

## 2024-12-30 PROCEDURE — 77080 DXA BONE DENSITY AXIAL: CPT | Mod: TC,PO

## 2025-01-03 DIAGNOSIS — M81.0 AGE-RELATED OSTEOPOROSIS WITHOUT CURRENT PATHOLOGICAL FRACTURE: Primary | ICD-10-CM

## 2025-01-06 ENCOUNTER — PATIENT MESSAGE (OUTPATIENT)
Facility: CLINIC | Age: 70
End: 2025-01-06
Payer: MEDICARE

## 2025-01-06 DIAGNOSIS — M81.0 OSTEOPOROSIS OF MULTIPLE SITES: Primary | ICD-10-CM

## 2025-01-13 ENCOUNTER — PATIENT MESSAGE (OUTPATIENT)
Facility: CLINIC | Age: 70
End: 2025-01-13
Payer: MEDICARE

## 2025-01-15 ENCOUNTER — HOSPITAL ENCOUNTER (OUTPATIENT)
Dept: RADIOLOGY | Facility: HOSPITAL | Age: 70
Discharge: HOME | End: 2025-01-15
Attending: STUDENT IN AN ORGANIZED HEALTH CARE EDUCATION/TRAINING PROGRAM
Payer: MEDICARE

## 2025-01-15 DIAGNOSIS — Z12.31 ENCOUNTER FOR SCREENING MAMMOGRAM FOR MALIGNANT NEOPLASM OF BREAST: ICD-10-CM

## 2025-01-15 PROCEDURE — 77063 BREAST TOMOSYNTHESIS BI: CPT | Mod: 26,,, | Performed by: RADIOLOGY

## 2025-01-15 PROCEDURE — 77067 SCR MAMMO BI INCL CAD: CPT | Mod: 26,,, | Performed by: RADIOLOGY

## 2025-01-15 PROCEDURE — 77067 SCR MAMMO BI INCL CAD: CPT | Mod: TC,PO

## 2025-01-17 NOTE — PROGRESS NOTES
Please call pt and make sure she knows her mammogram was abnormal and that she is scheduled for follow up imaging. Thanks!

## 2025-01-31 ENCOUNTER — TELEPHONE (OUTPATIENT)
Dept: ENDOSCOPY | Facility: HOSPITAL | Age: 70
End: 2025-01-31
Payer: MEDICARE

## 2025-01-31 NOTE — TELEPHONE ENCOUNTER
Attempted to call pt, left brief voicemail to schedule a mammo, if she did not understand my vm to call facility back with more understanding.

## 2025-01-31 NOTE — TELEPHONE ENCOUNTER
Pt left message to cancel colonoscopy. Spoke to patient and states she wants to have her colonoscopy at the facility that she had her last scope and will contact her PCP to help set this up.

## 2025-02-18 ENCOUNTER — TELEPHONE (OUTPATIENT)
Dept: RADIOLOGY | Facility: HOSPITAL | Age: 70
End: 2025-02-18
Payer: MEDICARE

## 2025-03-03 NOTE — PLAN OF CARE
Pt given verbal instructions on both procedures , she v/u.    64 year old male with PMH HTN, DM, HLD, hx cerical decompression surgery 4/2024, with recurrent syncopal episodes, who presents from the office after and abnormal echo and Nuclear stress test.  He reports MOCK, denies exertional chest pain.  TTE with mild LV systolic dysfunction and NST with anterior and apical ischemia.      No prior hx CAD, MI, CHF, No prior cardiac work up.  Has not yet been evaluated by a Neurologist for recurrent syncope.

## 2025-03-07 ENCOUNTER — PATIENT MESSAGE (OUTPATIENT)
Dept: ENDOSCOPY | Facility: HOSPITAL | Age: 70
End: 2025-03-07
Payer: MEDICARE

## 2025-03-19 ENCOUNTER — HOSPITAL ENCOUNTER (OUTPATIENT)
Dept: RADIOLOGY | Facility: HOSPITAL | Age: 70
Discharge: HOME | End: 2025-03-19
Attending: STUDENT IN AN ORGANIZED HEALTH CARE EDUCATION/TRAINING PROGRAM
Payer: MEDICARE

## 2025-03-19 DIAGNOSIS — R92.8 ABNORMAL MAMMOGRAM OF RIGHT BREAST: ICD-10-CM

## 2025-03-19 PROCEDURE — 77061 BREAST TOMOSYNTHESIS UNI: CPT | Mod: TC,RT

## 2025-03-19 PROCEDURE — 77061 BREAST TOMOSYNTHESIS UNI: CPT | Mod: 26,RT,, | Performed by: RADIOLOGY

## 2025-03-19 PROCEDURE — 77065 DX MAMMO INCL CAD UNI: CPT | Mod: 26,RT,, | Performed by: RADIOLOGY

## 2025-04-07 ENCOUNTER — LAB VISIT (OUTPATIENT)
Dept: LAB | Facility: HOSPITAL | Age: 70
End: 2025-04-07
Payer: MEDICARE

## 2025-04-07 ENCOUNTER — OFFICE VISIT (OUTPATIENT)
Facility: CLINIC | Age: 70
End: 2025-04-07
Payer: MEDICARE

## 2025-04-07 VITALS
SYSTOLIC BLOOD PRESSURE: 130 MMHG | DIASTOLIC BLOOD PRESSURE: 80 MMHG | TEMPERATURE: 98 F | BODY MASS INDEX: 21.2 KG/M2 | HEIGHT: 62 IN | RESPIRATION RATE: 18 BRPM | WEIGHT: 115.19 LBS

## 2025-04-07 DIAGNOSIS — M81.0 OSTEOPOROSIS, UNSPECIFIED OSTEOPOROSIS TYPE, UNSPECIFIED PATHOLOGICAL FRACTURE PRESENCE: Primary | ICD-10-CM

## 2025-04-07 DIAGNOSIS — Z71.2 ENCOUNTER TO DISCUSS TEST RESULTS: ICD-10-CM

## 2025-04-07 DIAGNOSIS — M25.552 LEFT HIP PAIN: ICD-10-CM

## 2025-04-07 DIAGNOSIS — M81.0 OSTEOPOROSIS OF MULTIPLE SITES: ICD-10-CM

## 2025-04-07 DIAGNOSIS — Z98.84 BARIATRIC SURGERY STATUS: ICD-10-CM

## 2025-04-07 LAB — PTH-INTACT SERPL-MCNC: 54.3 PG/ML (ref 9–77)

## 2025-04-07 PROCEDURE — 3075F SYST BP GE 130 - 139MM HG: CPT | Mod: CPTII,S$GLB,,

## 2025-04-07 PROCEDURE — G2211 COMPLEX E/M VISIT ADD ON: HCPCS | Mod: S$GLB,,,

## 2025-04-07 PROCEDURE — 84165 PROTEIN E-PHORESIS SERUM: CPT | Mod: ,,, | Performed by: PATHOLOGY

## 2025-04-07 PROCEDURE — 3008F BODY MASS INDEX DOCD: CPT | Mod: CPTII,S$GLB,,

## 2025-04-07 PROCEDURE — 3079F DIAST BP 80-89 MM HG: CPT | Mod: CPTII,S$GLB,,

## 2025-04-07 PROCEDURE — 1160F RVW MEDS BY RX/DR IN RCRD: CPT | Mod: CPTII,S$GLB,,

## 2025-04-07 PROCEDURE — 1101F PT FALLS ASSESS-DOCD LE1/YR: CPT | Mod: CPTII,S$GLB,,

## 2025-04-07 PROCEDURE — 36415 COLL VENOUS BLD VENIPUNCTURE: CPT | Mod: PN

## 2025-04-07 PROCEDURE — 3288F FALL RISK ASSESSMENT DOCD: CPT | Mod: CPTII,S$GLB,,

## 2025-04-07 PROCEDURE — 99999 PR PBB SHADOW E&M-EST. PATIENT-LVL III: CPT | Mod: PBBFAC,,,

## 2025-04-07 PROCEDURE — 1159F MED LIST DOCD IN RCRD: CPT | Mod: CPTII,S$GLB,,

## 2025-04-07 PROCEDURE — 99214 OFFICE O/P EST MOD 30 MIN: CPT | Mod: S$GLB,,,

## 2025-04-07 PROCEDURE — 83970 ASSAY OF PARATHORMONE: CPT

## 2025-04-07 PROCEDURE — 82652 VIT D 1 25-DIHYDROXY: CPT

## 2025-04-07 PROCEDURE — 1126F AMNT PAIN NOTED NONE PRSNT: CPT | Mod: CPTII,S$GLB,,

## 2025-04-07 PROCEDURE — 84165 PROTEIN E-PHORESIS SERUM: CPT

## 2025-04-07 NOTE — PROGRESS NOTES
SUBJECTIVE     Chief Complaint   Patient presents with    Follow-up     On mammogram and bone density results       HPI  Lulu Gonzalez is a 69 y.o. female with multiple medical diagnoses as listed in the medical history and problem list that presents for follow-up    Follow-up  Associated symptoms include joint swelling. Pertinent negatives include no arthralgias, chest pain, headaches, neck pain, vomiting or weakness.        History of Present Illness    CHIEF COMPLAINT:  Patient presents today with concerns about worsening bone scan results and left hip pain    MUSCULOSKELETAL:  She reports persistent left hip pain significantly impacting mobility, including difficulty climbing stairs and limitations in walking. She has a history of osteoporosis that improved to osteopenia while on medication. She discontinued osteoporosis medication 2 years ago. Recent bone scan shows severe risk of fracture.    SURGICAL HISTORY:  She underwent gastric bypass surgery in 2004 with subsequent complications including hiatal hernia and stomach perforations requiring exploratory surgery with scar tissue removal and repair. She had breast reduction surgery in 2006.    MEDICAL HISTORY:  She has a history of anemia.    MEDICATIONS:  She receives weight loss injections every two months.    MEDICATION INTOLERANCES:  She cannot take NSAIDs due to history of stomach complications following gastric bypass surgery. She cannot tolerate iron supplements due to severe constipation.      ROS:  General: -fever, -chills, -fatigue, -weight gain, -weight loss  Eyes: -vision changes, -redness, -discharge  ENT: -ear pain, -nasal congestion, -sore throat  Cardiovascular: -chest pain, -palpitations, -lower extremity edema  Respiratory: -cough, -shortness of breath  Gastrointestinal: -abdominal pain, -nausea, -vomiting, -diarrhea, -constipation, -blood in stool  Genitourinary: -dysuria, -hematuria, -frequency  Musculoskeletal: +joint pain, -muscle  "pain, +limb pain, +difficulty walking  Skin: -rash, -lesion  Neurological: -headache, -dizziness, -numbness, -tingling  Psychiatric: -anxiety, -depression, -sleep difficulty         PAST MEDICAL HISTORY:  Past Medical History:   Diagnosis Date    Ac marginal ulcer w perf     Hypertension     Jejunal ulcer     Thyroid disease        ALLERGIES AND MEDICATIONS: updated and reviewed.  Review of patient's allergies indicates:  No Known Allergies  Current Medications[1]    OBJECTIVE     Physical Exam  Vitals:    04/07/25 1524   BP: 130/80   Resp: 18   Temp: 97.9 °F (36.6 °C)    Body mass index is 21.07 kg/m².  Weight: 52.3 kg (115 lb 3.1 oz)   Height: 5' 2" (157.5 cm)     Physical Exam  Vitals reviewed.   Constitutional:       General: She is not in acute distress.  HENT:      Right Ear: External ear normal.      Left Ear: External ear normal.      Nose: Nose normal.      Mouth/Throat:      Mouth: Mucous membranes are moist.   Eyes:      Extraocular Movements: Extraocular movements intact.      Conjunctiva/sclera: Conjunctivae normal.      Pupils: Pupils are equal, round, and reactive to light.   Pulmonary:      Effort: Pulmonary effort is normal.   Abdominal:      General: There is no distension.      Palpations: Abdomen is soft.   Musculoskeletal:         General: No swelling. Normal range of motion.      Cervical back: Normal range of motion.   Skin:     General: Skin is warm and dry.      Findings: No rash.   Neurological:      General: No focal deficit present.      Mental Status: She is alert and oriented to person, place, and time.   Psychiatric:         Mood and Affect: Mood normal.         Behavior: Behavior normal.         Thought Content: Thought content normal.         Judgment: Judgment normal.           Health Maintenance         Date Due Completion Date    Colorectal Cancer Screening 05/11/2023 5/11/2018    COVID-19 Vaccine (5 - 2024-25 season) 09/01/2024 4/5/2021    Shingles Vaccine (2 of 2) 02/13/2025 " 12/19/2024    Mammogram 03/19/2026 3/19/2025    DEXA Scan 12/30/2026 12/30/2024    Lipid Panel 12/19/2029 12/19/2024    TETANUS VACCINE 09/14/2032 9/14/2022    RSV Vaccine (Age 60+ and Pregnant patients) (1 - 1-dose 75+ series) 11/29/2032 ---          ASSESSMENT     69 y.o. female with     1. Osteoporosis, unspecified osteoporosis type, unspecified pathological fracture presence    2. Left hip pain    3. Encounter to discuss test results    4. Bariatric surgery status        PLAN:     1. Osteoporosis, unspecified osteoporosis type, unspecified pathological fracture presence  New. Addressing.   -Patient notified that Dr. Griffin ordered additional testing.   -Patient reports that she has a virtual appointment with Endocrinologist.   -Counseled patient on the recommendations to draw additional labs and f/u with Endocrinologist.     2. Left hip pain  New. Addressing.   -Address patient questions and concerns.     3. Encounter to discuss test results  - Discussed recent lab results  - All questions/concerns addressed  - Pt voiced understanding     4. Bariatric surgery status  Stable.   - Patient's history of gastric bypass surgery in 2004.  - Recommend bariatric-specific calcium and multivitamin supplements.      Assessment & Plan    IMPRESSION:  - Reviewed history of osteoporosis treatment and recent bone scan results indicating significant osteoporosis with fracture risk.  - Considered need for workup of secondary causes of osteoporosis given unexpected progression despite previous treatment.  - Plan to review lab results (including calcium, vitamin D, parathyroid hormone) to guide osteoporosis treatment decision.  - Acknowledged left hip pain, likely related to osteoporosis; considered pain management options given history of gastric bypass and inability to take NSAIDs.    LEFT HIP PAIN:  - Acknowledged the patient's ongoing left hip pain affecting their ability to walk and climb stairs.  - Recommend Tylenol for pain  management.  - Advised against NSAIDs due to the patient's history of stomach issues.    OSTEOPOROSIS:  - Noted worsening bone scan results after the patient stopped osteoporosis medication for 2 years.  - Reviewed previous bone scan showing severe osteoporosis with increased fracture risk.  - Emphasized the importance of vitamin D activation through sunlight exposure and discussed the role of calcium and vitamin D in bone health.  - Provided information on different types of osteoporosis treatments, including oral medications and injections.  - Recommended calcium supplementation 1,000 to 1,200 mg daily and vitamin D supplementation 1,000 to 2,000 units daily.  - Recommend bariatric-specific calcium supplements.  - Referred the patient to an endocrinologist for further evaluation.  - Instructed the patient to keep scheduled virtual endocrinology appointment on May 26th.  - Patient to get 15-30 minutes of sunlight exposure daily to activate vitamin D. Advised patient to wear sunscreen.     FOLLOW-UP:  - Patient to bring most recent colonoscopy paperwork to drop off at the  for scanning into the system.         ESSENCE Vasquez  [facility]  04/07/2025 3:40 PM    I spent a total of 30 minutes on the day of the visit.This includes face to face time and non-face to face time preparing to see the patient (eg, review of tests), obtaining and/or reviewing separately obtained history, documenting clinical information in the electronic or other health record, independently interpreting results and communicating results to the patient/family/caregiver, or care coordinator.     Follow up if symptoms worsen or fail to improve.    This note was generated with the assistance of ambient listening technology. Verbal consent was obtained by the patient and accompanying visitor(s) for the recording of patient appointment to facilitate this note. I attest to having reviewed and edited the generated note  for accuracy, though some syntax or spelling errors may persist. Please contact the author of this note for any clarification.           [1]   Current Outpatient Medications   Medication Sig Dispense Refill    levothyroxine (SYNTHROID) 100 MCG tablet Take 1 tablet (100 mcg total) by mouth once daily. 90 tablet 3    phenazopyridine (PYRIDIUM) 200 MG tablet Take 200 mg by mouth 3 (three) times daily.      pramipexole (MIRAPEX) 0.125 MG tablet TAKE 1 TABLET (0.125 MG TOTAL) BY MOUTH EVERY EVENING. 90 tablet 3    cetirizine 0.24 % Dpet  (Patient not taking: Reported on 4/7/2025)      losartan-hydrochlorothiazide 100-12.5 mg (HYZAAR) 100-12.5 mg Tab Take 1 tablet by mouth once daily. (Patient not taking: Reported on 4/7/2025) 90 tablet 3     No current facility-administered medications for this visit.

## 2025-04-09 ENCOUNTER — RESULTS FOLLOW-UP (OUTPATIENT)
Facility: CLINIC | Age: 70
End: 2025-04-09

## 2025-04-09 LAB
ALBUMIN, SPE (OHS): 3.81 G/DL (ref 3.35–5.55)
ALPHA 1 GLOB (OHS): 0.28 GM/DL (ref 0.17–0.41)
ALPHA 2 GLOB (OHS): 0.65 GM/DL (ref 0.43–0.99)
BETA GLOB (OHS): 0.76 GM/DL (ref 0.5–1.1)
GAMMA GLOBULIN (OHS): 0.9 GM/DL (ref 0.67–1.58)
PATHOLOGIST REVIEW - SPE (OHS): NORMAL
PROT SERPL-MCNC: 6.4 GM/DL (ref 6–8.4)

## 2025-04-10 LAB — W VITAMIN D, 1, 25-DIHYDROXY: 58 PG/ML

## 2025-04-11 ENCOUNTER — PATIENT MESSAGE (OUTPATIENT)
Facility: CLINIC | Age: 70
End: 2025-04-11
Payer: MEDICARE

## 2025-04-11 ENCOUNTER — RESULTS FOLLOW-UP (OUTPATIENT)
Facility: CLINIC | Age: 70
End: 2025-04-11

## 2025-04-11 DIAGNOSIS — M81.0 AGE-RELATED OSTEOPOROSIS WITHOUT CURRENT PATHOLOGICAL FRACTURE: Primary | ICD-10-CM

## 2025-04-11 NOTE — PROGRESS NOTES
Please call pt and let her know that her labs for osteoporosis came back ok, so I recommend starting Prolia infusions to address this. I have ordered them and she can call to schedule with the infusion center. Thanks!

## 2025-04-13 ENCOUNTER — PATIENT MESSAGE (OUTPATIENT)
Facility: CLINIC | Age: 70
End: 2025-04-13
Payer: MEDICARE

## 2025-05-26 ENCOUNTER — OFFICE VISIT (OUTPATIENT)
Dept: ENDOCRINOLOGY | Facility: CLINIC | Age: 70
End: 2025-05-26
Payer: MEDICARE

## 2025-05-26 DIAGNOSIS — M81.0 AGE-RELATED OSTEOPOROSIS WITHOUT CURRENT PATHOLOGICAL FRACTURE: ICD-10-CM

## 2025-05-26 PROCEDURE — 98002 SYNCH AUDIO-VIDEO NEW MOD 45: CPT | Mod: 95,,, | Performed by: INTERNAL MEDICINE

## 2025-05-26 PROCEDURE — 1126F AMNT PAIN NOTED NONE PRSNT: CPT | Mod: CPTII,95,, | Performed by: INTERNAL MEDICINE

## 2025-05-26 PROCEDURE — 1159F MED LIST DOCD IN RCRD: CPT | Mod: CPTII,95,, | Performed by: INTERNAL MEDICINE

## 2025-05-26 PROCEDURE — G2211 COMPLEX E/M VISIT ADD ON: HCPCS | Mod: 95,,, | Performed by: INTERNAL MEDICINE

## 2025-05-26 PROCEDURE — 1160F RVW MEDS BY RX/DR IN RCRD: CPT | Mod: CPTII,95,, | Performed by: INTERNAL MEDICINE

## 2025-05-26 RX ORDER — ISOPROPYL ALCOHOL 70 ML/100ML
1 SWAB TOPICAL DAILY
Qty: 100 EACH | Refills: 3 | Status: ACTIVE | OUTPATIENT
Start: 2025-05-26

## 2025-05-26 RX ORDER — TERIPARATIDE 250 UG/ML
20 INJECTION, SOLUTION SUBCUTANEOUS DAILY
Qty: 2.4 ML | Refills: 11 | Status: ACTIVE | OUTPATIENT
Start: 2025-05-26 | End: 2026-05-26

## 2025-05-26 NOTE — PROGRESS NOTES
ENDOCRINOLOGY INITIAL VISIT  05/26/2025    Reason for Visit:  referred by Yanet Griffin MD for evaluation of osteoporosis    HPI:  Lulu Gonzalez is a 69 y.o. female    With regards to osteoporosis:     Diagnosed with osteoporosis in 5 years ago based on bone density test.    Osteoporosis medication history:   None  Raloxifene: 2018 to 2022    Calcium supplements?  Calcium two tablets once a day   Consumes dairy products regularly? Yes  Eats fresh green vegetables regularly? Yes    Vit D3 intake?  With her calcium IU per day     Lab Results   Component Value Date    [id number] 47 03/21/2018     Weight bearing exercise?   Walks on a regular exercise   Sense of balance? good  Recent falls? no  Patient is using the following assist devices for ambulation: none  Height loss (>2 inches)? no    Fracture history:   2010s: ankle fracture due to after a trip in a parking lot, using platform slides. Now wears flat shoes    Tobacco use?  no  EtOH use?   no     Current diarrhea or h/o malabsorption? no  GERD or stomach ulcers? no  Thyroid disease? yes  Anemia? no  Kidney Stones? no  Hyperparathyroidism? no    High risk medications include:  none    Post-menopausal? Age?: unknown  Hysterectomy only at 38 years    ERT after menopause?: no    Mom or dad with hip fracture?: no    Family history of osteoporosis?: no     Malignancy involving bone (active or history)? no  Prior radiation treatment? no  Unexplained elevation of alkaline phosphatase? no    Dental work planned? no    Hypothyroidism   On treatment with levothyroxine 100 mcg daily   Denies symptoms of thyrotoxicosis   Lab Results   Component Value Date    TSH 0.506 12/19/2024       Lab Results   Component Value Date    PTH 54.3 04/07/2025    [id number] 47 03/21/2018    CALCIUM 9.4 12/19/2024    CALCIUM 9.1 05/03/2024    CALCIUM 8.9 11/20/2023    PHOS 3.0 02/16/2016    PHOS 3.6 02/15/2016    PHOS 4.7 (H) 02/14/2016    ALKPHOS 112 12/19/2024    ALKPHOS 101  "05/03/2024    ALKPHOS 93 11/20/2023    TSH 0.506 12/19/2024         No results found for: "CTELOPEPTIDE", "PROCOLLAGEN"      Review of patient's allergies indicates:  No Known Allergies    Current Medications[1]      ROS: see HPI     PHYSICAL EXAM  There were no vitals taken for this visit.  Wt Readings from Last 3 Encounters:   04/07/25 52.3 kg (115 lb 3.1 oz)   12/19/24 58.7 kg (129 lb 6.6 oz)   11/02/21 85.4 kg (188 lb 4.4 oz)   ]    Constitutional:  Pleasant,  in no acute distress.       IMAGING:  Independently reviewed bone density images, region of interests appropriate at all three sites.   T scores consistent with diagnosis of osteoporosis, T score at TH -3.3, Z score -1.9  FRAX demonstrates high risk for fractures, 16% and 4.3%.     ASSESSMENT/PLAN    Problem List Items Addressed This Visit          1 - High    Age-related osteoporosis without current pathological fracture    Risk factors: postmenopausal status, ,   Low body weight.    No previous fragility fractures   FRAX elevated     Due for DXA scan in 12/2026, discussed importance of scheduling in the same location to allow for comparison    Secondary evaluation:   PTH normal   Plan to obtain renal function, 24 hr urine for ruben/creat, TTG  Serum calcium, creatinine, thyroid function tests and liver enzymes are normal.     Continue calcium and vitamin D  Continue to stay active, exercise and focus on strength and core    Treatment options and potential side effects discussed for PO bisphosphonates, Reclast, Prolia.     Discussed risk of osteonecrosis of the jaw and AFF with bisphosphonates and denosumab. Despite low risk the significant benefit on fracture reduction far outweighs the potential for this rare event.           Relevant Medications    teriparatide (FORTEO) 20 mcg/dose (560mcg/2.24mL) PnIj    alcohol swabs (ALCOHOL PREP) PadM    Other Relevant Orders    Calcium, Timed Urine [facility]; 24    Creatinine, urine, timed    Vitamin D    " Tissue Transglutaminase, IgA    C Telopeptide (CTX), Serum    Renal Function Panel         Physical exam was not performed and vitals were not obtained due to this being a telemedicine (virtual) visit.    The patient location is: home/[place]  The chief complaint leading to consultation is:   Visit type: Virtual visit with synchronous audio and video  Total time spent with patient: 30 min    RTC in 12 months      Carrol Wadsworth MD         [1]   Current Outpatient Medications:     levothyroxine (SYNTHROID) 100 MCG tablet, Take 1 tablet (100 mcg total) by mouth once daily., Disp: 90 tablet, Rfl: 3    pramipexole (MIRAPEX) 0.125 MG tablet, TAKE 1 TABLET (0.125 MG TOTAL) BY MOUTH EVERY EVENING., Disp: 90 tablet, Rfl: 3    alcohol swabs (ALCOHOL PREP) PadM, Apply 1 each topically once daily. To use with Forteo injections, Disp: 100 each, Rfl: 3    phenazopyridine (PYRIDIUM) 200 MG tablet, Take 200 mg by mouth 3 (three) times daily., Disp: , Rfl:     teriparatide (FORTEO) 20 mcg/dose (560mcg/2.24mL) PnIj, Inject 0.08 mLs (20 mcg total) into the skin once daily., Disp: 2.4 mL, Rfl: 11

## 2025-05-26 NOTE — PATIENT INSTRUCTIONS
"    Osteoporosis medications  These are the medications we discussed for osteoporosis treatment:    - Bisphosphonates:         - these slow down bone loss         - oral forms (Fosamax and Actonel are examples) - taken once weekly or once monthly         - IV form (Reclast) - given intravenously once yearly    - Prolia (denosumab):          - slows down bone loss           - it is a subcutaneous injection (under the skin) every 6 months, given in the office    - *Forteo (teriparatide) or Tymlos (abaloparatide):              - medications that "build bone" or increases bone formation. Typically the course is for 24 months              - subcutaneous injection (under the skin) taken once daily that you self-administer at home   - needs a medicine to "seal" in the bone you have gained, typically we use a bisphosphonate or prolia    - *Evenity (romozosumab)   - medications that "build bone" or increases bone formation and slow down bone loss              - 2 subcutaneous injection (under the skin) taken once monthly to schedule at [facility] for 12 months   - need a medicine to "seal" in the bone you have gained, typically we use reclast or prolia once you have completed the 12 month course.   For more information on medications: https://www.nof.org/patients/treatment/medicationadherence/    HOW TO COLLECT AN ACCURATE   24 HOUR URINE SPECIMEN     I want you to collect EVERY drop of your urine during a 24 hour period. I do not care what the volume of the urine is as long as it represents every drop that you pass during that 24 hour period. If you need to have a bowel movement, you may separate the urine but I want every drop.     Begin the collection on a morning which is convenient for you. At that time, pass your urine, flush it down the toilet and note the exact time. Now you have an empty bladder and empty bottle. That starts the collection. Collect every drop all during the day and night until exactly the same time the " next morning, when you should pass your urine and add it to the bottle.     Bring the closed container back to the same laboratory that issued the empty container.       Exercise:   Weight training   Core exercises   Walking a few times a week    Balance exercise improves stability and may prevent falls and reduce injuries related to falls.     A list of evidence-based fall prevention programs can be found on the National [place] on Aging website. This website also provides guidance on how patients can find falls prevention programs near their home.    [place] chi improves balance and reduce falls risk     Start low and go slow

## 2025-05-26 NOTE — ASSESSMENT & PLAN NOTE
Risk factors: postmenopausal status, ,   Low body weight.    No previous fragility fractures   FRAX elevated     Due for DXA scan in 12/2026, discussed importance of scheduling in the same location to allow for comparison    Secondary evaluation:   PTH normal   Plan to obtain renal function, 24 hr urine for ruben/creat, TTG  Serum calcium, creatinine, thyroid function tests and liver enzymes are normal.     Continue calcium and vitamin D  Continue to stay active, exercise and focus on strength and core    Treatment options and potential side effects discussed for PO bisphosphonates, Reclast, Prolia.     Discussed risk of osteonecrosis of the jaw and AFF with bisphosphonates and denosumab. Despite low risk the significant benefit on fracture reduction far outweighs the potential for this rare event.

## 2025-05-27 ENCOUNTER — PATIENT MESSAGE (OUTPATIENT)
Facility: CLINIC | Age: 70
End: 2025-05-27
Payer: MEDICARE

## 2025-05-27 DIAGNOSIS — Z91.038 ALLERGIC REACTION TO INSECT BITE: Primary | ICD-10-CM

## 2025-05-27 RX ORDER — HYDROXYZINE HYDROCHLORIDE 25 MG/1
25-50 TABLET, FILM COATED ORAL NIGHTLY PRN
Qty: 45 TABLET | Refills: 0 | Status: SHIPPED | OUTPATIENT
Start: 2025-05-27 | End: 2025-06-26

## 2025-05-27 RX ORDER — TRIAMCINOLONE ACETONIDE 1 MG/G
OINTMENT TOPICAL 2 TIMES DAILY
Qty: 80 G | Refills: 0 | Status: SHIPPED | OUTPATIENT
Start: 2025-05-27 | End: 2025-06-03

## 2025-06-17 ENCOUNTER — PATIENT MESSAGE (OUTPATIENT)
Dept: ENDOCRINOLOGY | Facility: CLINIC | Age: 70
End: 2025-06-17
Payer: MEDICARE

## 2025-06-17 RX ORDER — PEN NEEDLE, DIABETIC 30 GX3/16"
NEEDLE, DISPOSABLE MISCELLANEOUS
Qty: 100 EACH | Refills: 3 | Status: SHIPPED | OUTPATIENT
Start: 2025-06-17

## 2025-06-25 ENCOUNTER — PATIENT MESSAGE (OUTPATIENT)
Facility: CLINIC | Age: 70
End: 2025-06-25
Payer: MEDICARE

## 2025-06-26 ENCOUNTER — TELEPHONE (OUTPATIENT)
Dept: ENDOCRINOLOGY | Facility: CLINIC | Age: 70
End: 2025-06-26
Payer: MEDICARE

## 2025-06-26 NOTE — TELEPHONE ENCOUNTER
Spoke to pt. Pt will  the container today and complete the collection on Monday. Explain to pt once the evaluation is done I can schedule her with our pharmacist payam.

## 2025-06-30 ENCOUNTER — RESULTS FOLLOW-UP (OUTPATIENT)
Dept: ENDOCRINOLOGY | Facility: CLINIC | Age: 70
End: 2025-06-30

## 2025-06-30 ENCOUNTER — LAB VISIT (OUTPATIENT)
Dept: LAB | Facility: HOSPITAL | Age: 70
End: 2025-06-30
Attending: INTERNAL MEDICINE
Payer: MEDICARE

## 2025-06-30 DIAGNOSIS — M81.0 AGE-RELATED OSTEOPOROSIS WITHOUT CURRENT PATHOLOGICAL FRACTURE: ICD-10-CM

## 2025-06-30 LAB
25(OH)D3+25(OH)D2 SERPL-MCNC: 41 NG/ML (ref 30–96)
ALBUMIN SERPL BCP-MCNC: 3.9 G/DL (ref 3.5–5.2)
ANION GAP (OHS): 10 MMOL/L (ref 8–16)
BUN SERPL-MCNC: 24 MG/DL (ref 8–23)
CALCIUM SERPL-MCNC: 9.1 MG/DL (ref 8.7–10.5)
CHLORIDE SERPL-SCNC: 103 MMOL/L (ref 95–110)
CO2 SERPL-SCNC: 26 MMOL/L (ref 23–29)
CREAT SERPL-MCNC: 0.8 MG/DL (ref 0.5–1.4)
GFR SERPLBLD CREATININE-BSD FMLA CKD-EPI: >60 ML/MIN/1.73/M2
GLUCOSE SERPL-MCNC: 81 MG/DL (ref 70–110)
PHOSPHATE SERPL-MCNC: 3.5 MG/DL (ref 2.7–4.5)
POTASSIUM SERPL-SCNC: 3.9 MMOL/L (ref 3.5–5.1)
SODIUM SERPL-SCNC: 139 MMOL/L (ref 136–145)

## 2025-06-30 PROCEDURE — 36415 COLL VENOUS BLD VENIPUNCTURE: CPT | Mod: PO

## 2025-06-30 PROCEDURE — 82040 ASSAY OF SERUM ALBUMIN: CPT

## 2025-06-30 PROCEDURE — 82306 VITAMIN D 25 HYDROXY: CPT

## 2025-06-30 PROCEDURE — 82523 COLLAGEN CROSSLINKS: CPT

## 2025-06-30 PROCEDURE — 86364 TISS TRNSGLTMNASE EA IG CLAS: CPT

## 2025-07-02 LAB — CEFOTAXIME ISLT MIC: 678 PG/ML

## 2025-07-03 LAB — W TISSUE TRANSGLUTAMINASE IGA AB: <0.2 U/ML

## 2025-07-07 ENCOUNTER — PATIENT MESSAGE (OUTPATIENT)
Dept: ENDOCRINOLOGY | Facility: CLINIC | Age: 70
End: 2025-07-07
Payer: MEDICARE

## 2025-07-10 ENCOUNTER — OFFICE VISIT (OUTPATIENT)
Dept: ENDOCRINOLOGY | Facility: CLINIC | Age: 70
End: 2025-07-10
Payer: MEDICARE

## 2025-07-10 DIAGNOSIS — M81.0 AGE-RELATED OSTEOPOROSIS WITHOUT CURRENT PATHOLOGICAL FRACTURE: Primary | ICD-10-CM

## 2025-07-10 NOTE — PATIENT INSTRUCTIONS
More information about: FORTEO (teriparatide) injection    General information:  Forteo is given as a subcutaneous injection once per day. The medication will help to rebuild your bone density help to prevent further fractures.   It's given for a total of two years, followed by an alternative medication to consolidate the gains from the Forteo.   Side effects are usually mild if they occur. The most common thing is light headedness that can occur within an hour of taking the medication. Usually this is mild and can be remedied by taking the medication at night time before bed.   In rat studies, the medication was linked to a rare bone cancer called osteosarcoma. This drug has now been on the market for more than 20 years and it has not been shown to increase the risk of osteosarcoma in humans.    More info on OF website:  https://www.bonehealthandosteoporosis.org/patients/treatment/medicationadherence/teriparatide-parathyroid-hormone-otm-5-26-forteo/    How do I get started on Forteo?  Inject Forteo in the thigh or abdomen once daily.   Forteo must be injected. You cannot take Forteo as a pill because your stomach would digest the medicine before it has a chance to work.    Things to know about Forteo:  You can take Forteo with or without food or drink.  Do not freeze the Forteo delivery device. Do not use Forteo if it has been frozen.   Forteo should look clear and colorless. Do not use Forteo if it has particles in it, or if it is cloudy or colored.  Inject Forteo right away after you take the delivery device out of the refrigerator. After each use, safely remove the needle, recap the delivery device, and put it back in the refrigerator right away.  Inject Forteo one time each day in your thigh or abdomen (lower stomach area). Talk to a healthcare provider about how to rotate injection sites.  You can take Forteo at any time of the day. To help you remember to take Forteo, take it at about the same time each  day.  If you forget or cannot take Forteo at your usual time, take it as soon as you can on that day. Do not take more than one injection in the same day.  Do not use Forteo after the expiration date printed on the delivery device and packaging.  Throw away the Forteo delivery device after 28 days even if it has medicine in it (see the User Manual ).  The Forteo delivery device has enough medicine for 28 days. It is set to give a 20-microgram dose of medicine each day. Do not inject all the medicine in the Forteo delivery device at any one time.  If you take more Forteo than prescribed, call your healthcare provider. If you take too much Forteo, you may have nausea, vomiting, weakness, or dizziness.  Do not transfer the medicine from the Forteo delivery device to a syringe. This can result in taking the wrong dose.    How do I travel with Forteo?  Whether you're going on a road trip for a day or two, or crossing the globe, traveling with the Forteo Delivery Device can be as simple as following a few basic steps.  Remember to keep your delivery device with you when traveling, for example in a purse, tote, or backpack.  In addition to your delivery device, you will need to pack all the sealed needles you'll need for the trip and a copy of your Forteo prescription label, which is attached to your delivery device box. You may be asked to verify that the delivery device and needles belong to you.  During travel, the delivery device can remain at room temperature, up to 77 degrees, for a total of 36 hours for each device.  Even though the device can remain at room temperature for a total of 36 hours, leave it refrigerated until right before you leave.  Forteo can stay at room temperature for a total of 36 hours per device, but you should always keep your delivery device close by. Do not pack your Forteo Delivery Device in the trunk of your car or check it in your luggage. This is to prevent exposing the delivery device to  extreme temperatures.  When you arrive at your destination, store your delivery device in the refrigerator right away, but never in the freezer. Your delivery device should always be refrigerated between 36 and 46 degrees Fahrenheit when you are not traveling.  Only keep Forteo out of the refrigerator while you're traveling. Otherwise, keep refrigerated whenever possible.    Prepping to travel with Forteo:  For daily use, Forteo should be refrigerated. When traveling and refrigeration is unavailable, the Forteo delivery device can remain at room temperature conditions (77°F [25°C]) for up to a total of 36 hours per device. Pack the needles you'll need for your trip. Keep Forteo with you when traveling to prevent it from being damaged, lost, or exposed to extreme temperatures. Do not put Forteo in the trunk of a car or in checked baggage. Refrigerate Forteo as soon as you arrive at your destination.    Taking Forteo on airplanes:  Traveling by air requires a few extra considerations. Remember, you have the right to bring medically necessary supplies on board a plane. Just follow these steps:  Always carry a copy of your Forteo prescription label, which is attached to your Forteo delivery device box. You may be asked to verify that the Forteo delivery device and needles belong to you.  Make sure your needles are sealed and not attached to your Forteo delivery device. (Some airlines may not allow you to bring unsealed needles onto a plane).  Never check your Forteo delivery device with your checked luggage. Always bring it with you onto the airplane as a carry-on, as the temperature in the checked luggage area is not always controlled and may become too hot or cold.  At the security checkpoint, tell the security officers that you are carrying medication that you need to bring on board with you.  It is okay to pass your Forteo delivery device through a standard airport security scanner.  Consult [facility] for the latest  guidance in traveling with medicine and needles. Visit the [facility] Website or call the [facility] Contact Center toll free at [phone].  When traveling abroad with medication refer to the [place] Department of State Tips for Traveling Abroad website for guidelines.    If you have additional questions on traveling, please call [employer] at [phone] ([phone]).

## 2025-07-10 NOTE — PROGRESS NOTES
"[facility]  Endocrinology Clinic  Pharmacist Note     Today's Visit Date: 7/10/2025     The patient location is: [place]  The chief complaint leading to consultation is: Injection training    Visit type: audiovisual    Each patient to whom he or she provides medical services by telemedicine is:  (1) informed of the relationship between the physician and patient and the respective role of any other health care provider with respect to management of the patient; and (2) notified that he or she may decline to receive medical services by telemedicine and may withdraw from such care at any time.    Reason for Visit: Forteo injection training     History of Present Illness:  Patient was last seen by Dr. Wadsworth on 5/26/2025 for osteoporosis.   - Diagnosed with osteoporosis in 5 years ago based on bone density test   - Due for DXA scan in 12/2026      Summary:    The patient was provided detailed education on Forteo, including its purpose in treating osteoporosis and its role in stimulating bone formation. Training covered  Injection technique: Proper handling of the pre-filled pen, priming the device, dose selection, and administering the injection into recommended sites (abdomen or thigh).  Site rotation: Emphasis on rotating injection sites to minimize irritation or tissue damage.  Storage instructions: Importance of keeping the medication refrigerated and avoiding freezing.  Disposal protocols: Proper disposal of used needles in a sharps container.    The following specifics for the Forteo delivery device were reviewed:  - Attach a new needle before each injection  - Prime the pen before each injection by turning the dose knob to to "1"; a bailey will appear in the dose window when priming is complete  - The number "2" in the dose window means the dose is ready to inject  - The skin may need to be pinched up to avoid a deep injection into the muscle   - After injection, reset the dose by turning the " knob clockwise until you see an arrow in the dose window and the raised notches on the pen and dose knob are in line  - Store in the refrigerator right after use  - Can use pen for up to 28 days; after 28 days throw away the pen even if it is not completely empty    Anny Chaudhary, PharmD  Clinical Pharmacist - Endocrinology